# Patient Record
Sex: FEMALE | Race: WHITE
[De-identification: names, ages, dates, MRNs, and addresses within clinical notes are randomized per-mention and may not be internally consistent; named-entity substitution may affect disease eponyms.]

---

## 2018-02-05 NOTE — HP
PRIMARY CARE PHYSICIAN:  Unknown.

 

PRESENTING COMPLAINT:  Altered mental status.

 

HISTORY OF PRESENT ILLNESS:  A 68-year-old patient who was found with a change 
of mental status at home.  Her mother found her on the ground with an empty 
bottle of tramadol by her side.  It seemed she has had ingested at least 10 
pills, but total amount is actually unknown as is the time of ingestion.  EMS 
was immediately called and the patient was given Narcan, which she seems to 
respond to and was alert and oriented x2.  History limited due to the patient 
being lethargic.  On arrival at the emergency room, she was hypertensive with 
blood pressure of 172/76 with a pulse rate of 26 and a respiratory rate of 21.  
She was saturating at 88% on room air.  She was immediately placed on nasal 
cannula oxygen.  Labs revealed mild leukocytosis.  Chemistry was largely 
unrevealing with troponin less than 0.010.  TSH was 1.6.  Urine toxicology 
detected tricyclics.  Emergency room physicians called Poison Control and 
recommendation was conservative managements with IV fluids and to be on the 
lookout for possible serotonin syndrome.

 

PAST MEDICAL HISTORY:  Chronic lower back pain, migraine headaches, 
hypothyroidism and diverticulitis.

 

PAST SURGICAL HISTORY:  Lumpectomy and lipomectomy.

 

ALLERGIES:  PENICILLIN and PROPOXYPHENE.

 

FAMILY AND SOCIAL HISTORY:  Unobtainable due to patient's current condition.  
She is a former smoker with no history of drug use or alcohol use (from chart 
review).

 

REVIEW OF SYSTEMS:  Review of systems is unobtainable due to patient's current 
altered status.

 

PHYSICAL EXAMINATION:

GENERAL:  The patient is lethargic, but oriented to person and place.

VITAL SIGNS:  Blood pressure is 132/84, heart rate is 95, respiratory rate is 20
, she is saturating at 92% on 4 liters of supplemental oxygen via nasal cannula.

HEENT:  Pupils are dilated, responsive to the light.  No pallor, anicteric.

NECK:  Supple.

LUNGS:  Patient is taking shallow breaths, but vesicular with no wheezes or 
rales appreciated.

CARDIOVASCULAR:  S1 and S2 only.  No murmurs, rubs or gallop.

ABDOMEN:  Soft, nontender and nondistended.  Positive bowel sounds.  No 
organomegaly.

EXTREMITIES:  No edema.

NEUROLOGIC:  Unable to cooperate with exam.

SKIN:  Warm, dry and well-perfused.

MUSCULOSKELETAL:  No abnormalities or deformities appreciated.

 

LABORATORY DATA:  CBC and CMP unremarkable.  Urinalysis also unremarkable.  
Toxicology with only significant finding being tricyclic detected.

 

IMAGING DATA:  Chest x-ray:  Enlarged heart with vascular congestion, but no 
consolidation or effusions.  CT brain without contrast showed no acute 
pathology.

 

ASSESSMENT AND PLAN:

1.  Medication overdose, unclear if it is an accidental or intentional 
overdose.  Patient will be admitted to tele and recommendation from Poison 
Control is to monitor the patient for at least 6-8 hours and observe for 
possible serotonin syndrome.  She will be placed on tele given liberal IV 
fluids.  We will obtain an [employer] evaluation when patient is more alert and 
oriented.  We will also have Narcan p.r.n. for any signs of respiratory 
depression.

2.  Anxiety and depression.  The patient's medication will be held.

3.  Chronic pain disorder, also her medications will be held and she will be 
observed closely while in the hospital.  Of note, patient also has a history of 
fibromyalgia and this is actually her second episode of accidental overdose of 
prescription medications.  Last admission was in 11/2017 where she reported 
taking extra medications due to continued pain.  She became altered and 
required admission to the ICU, but no intubation.

 

[place]

## 2018-02-05 NOTE — CT
CT BRAIN WITHOUT CONTRAST:

 

HISTORY:

Altered mental status.

 

COMPARISON:

11/22/2016

 

FINDINGS:

Changes of mild chronic small vessel ischemic disease are again seen in the periventricular white mat
ter.  No evidence of acute infarct, hemorrhage, midline shift, or abnormal extraaxial fluid collectio
ns is seen.  The ventricular size is normal, and the basilar cisterns are patent.  The bony calvarium
 is intact.  The visualized paranasal sinuses and mastoid air cells are well aerated.

 

IMPRESSION:

No CT evidence of acute intracranial process.

 

POS: [facility]

## 2018-02-07 NOTE — DIS
DATE OF ADMISSION:  02/05/2018

 

DATE OF DISCHARGE:  02/06/2018

 

PRIMARY DIAGNOSIS:  Medication overdose.

 

SECONDARY DIAGNOSES:  Anxiety/depression, chronic pain disorder, hypothyroidism, migraine headaches

 

HISTORY OF PRESENT ILLNESS AND HOSPITAL COURSE:  This is a 68-year-old patient, Ms. Tatum fields presented to the emergency room after being found with a change of mental status at home.  The pat
leeann's mother found on the ground with an empty bottle of tramadol by her side.  It seems she had ing
ested maybe 10 pills, but total amount actually unknown as well as the time of ingestion.  EMS was im
mediately called and patient was given Narcan on which she seemed to respond to and became alert and 
oriented x2.  History limited due to patient being lethargic.  On examination of the emergency room, 
she was hypertensive with blood pressure of 172/76 with a pulse rate of 26 and respiratory rate of 21
.  She was saturating at 88% on room air.  She was immediately placed on nasal cannula oxygen.  Labs 
revealed mild leukocytosis.  Serum chemistry was largely unrevealing with troponin less than 0.010.  
TSH was 1.6.  Urine toxicology detected tricyclics, which the patient takes at home.  Emergency room 
physician called Poison Control.  Recommendation was conservative management with IV fluids.  Monitor
 on telemetry for at least 6 hours and to be on the lookout for possible serotonin syndrome.  During 
her stay, she became alert and oriented x3 and was back to her baseline.  Labs were unremarkable.  Di
scussion was had with the patient regarding her medication use and she denied intention of ingestion 
of her pain meds.  Further discussion was held regarding to dialing down on her pain medication due t
o this pain, second time she was admitted for medication overdose.  The patient declined and would li
ke to continue to take her medications as prescribed by her primary care physician.  She was then dis
charged without incident.

 

DISCHARGE MEDICATIONS:  Potassium chloride 20 mEq every morning with breakfast for 5 days, levothyrox
ine sodium 50 mcg daily, cyclobenzaprine 10 mg orally at bedtime as needed, esomeprazole 40 mg oral e
very morning with breakfast, tramadol 50 mg 3 times a day as needed for pain, pregabalin 300 mg twice
 daily, citalopram  10 mg oral daily, acetaminophen 325 mg 4 times a day as needed for pain.

 

PHYSICAL EXAMINATION:

VITAL SIGNS:  Temperature 97.8, pulse 66, blood pressure 130/60, respirations 17, oxygen saturation 9
7% on room air.

 

LABORATORY DATA:  Sodium 138, potassium 3.5, chloride 104, carbon dioxide 28, anion gap 7, BUN 11, cr
eatinine 0.58.  WBC 14.6, RBC 3.92, hemoglobin 11.8, platelets 245.

 

DISPOSITION:  Stable and improved.

 

CONSULTATION:  Poison Control.

 

PROCEDURES:  None.

 

DIET:  Regular.

 

CARE GOALS:  The patient encouraged to follow up with her primary care physician within 1 week of dis
charge.  She is also encouraged to return to the emergency room if she developed any episode of confu
luis, chest pain, shortness of breath or loss of consciousness.

 

ACTIVITY:  To resume as tolerated.  At the time of discharge, 65 minutes including chart review and d
ocumentation.

## 2018-12-29 ENCOUNTER — HOSPITAL ENCOUNTER (INPATIENT)
Dept: HOSPITAL 92 - ERS | Age: 68
LOS: 6 days | Discharge: NURSING, REHAB OR LONG-TERM CARE | DRG: 492 | End: 2019-01-04
Attending: FAMILY MEDICINE | Admitting: FAMILY MEDICINE
Payer: MEDICARE

## 2018-12-29 VITALS — BODY MASS INDEX: 31.1 KG/M2

## 2018-12-29 DIAGNOSIS — F41.8: ICD-10-CM

## 2018-12-29 DIAGNOSIS — M54.9: ICD-10-CM

## 2018-12-29 DIAGNOSIS — T50.905A: ICD-10-CM

## 2018-12-29 DIAGNOSIS — G93.41: ICD-10-CM

## 2018-12-29 DIAGNOSIS — M79.7: ICD-10-CM

## 2018-12-29 DIAGNOSIS — G43.909: ICD-10-CM
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
## 2018-12-29 DIAGNOSIS — G89.29: ICD-10-CM

## 2018-12-29 DIAGNOSIS — S82.142A: Primary | ICD-10-CM

## 2018-12-29 DIAGNOSIS — E03.9: ICD-10-CM

## 2018-12-29 DIAGNOSIS — S82.832A: ICD-10-CM

## 2018-12-29 DIAGNOSIS — Z88.0: ICD-10-CM

## 2018-12-29 DIAGNOSIS — Z88.8: ICD-10-CM

## 2018-12-29 DIAGNOSIS — I48.0: ICD-10-CM
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
## 2018-12-29 LAB
ALBUMIN SERPL BCG-MCNC: 4.1 G/DL (ref 3.4–4.8)
ALP SERPL-CCNC: 88 U/L (ref 40–150)
ALT SERPL W P-5'-P-CCNC: 20 U/L (ref 8–55)
ANALYZER IN CARDIO: (no result)
ANION GAP SERPL CALC-SCNC: 14 MMOL/L (ref 10–20)
APAP SERPL-MCNC: (no result) MCG/ML (ref 10–30)
AST SERPL-CCNC: 30 U/L (ref 5–34)
BASE EXCESS STD BLDA CALC-SCNC: 1.6 MEQ/L
BASOPHILS # BLD AUTO: 0 THOU/UL (ref 0–0.2)
BASOPHILS NFR BLD AUTO: 0.3 % (ref 0–1)
BILIRUB SERPL-MCNC: 0.5 MG/DL (ref 0.2–1.2)
BUN SERPL-MCNC: 16 MG/DL (ref 9.8–20.1)
CA-I BLDA-SCNC: 1.1 MMOL/L (ref 1.12–1.3)
CALCIUM SERPL-MCNC: 8.9 MG/DL (ref 7.8–10.44)
CHLORIDE SERPL-SCNC: 96 MMOL/L (ref 98–107)
CK SERPL-CCNC: 602 U/L (ref 29–168)
CO2 SERPL-SCNC: 27 MMOL/L (ref 23–31)
CREAT CL PREDICTED SERPL C-G-VRATE: 0 ML/MIN (ref 70–130)
DRUG SCREEN CUTOFF: (no result)
EOSINOPHIL # BLD AUTO: 0.1 THOU/UL (ref 0–0.7)
EOSINOPHIL NFR BLD AUTO: 0.5 % (ref 0–10)
GLOBULIN SER CALC-MCNC: 2.4 G/DL (ref 2.4–3.5)
GLUCOSE SERPL-MCNC: 99 MG/DL (ref 80–115)
GLUCOSE UR STRIP-MCNC: 250 MG/DL
HCO3 BLDA-SCNC: 26 MEQ/L (ref 22–28)
HCT VFR BLDA CALC: 37 % (ref 36–47)
HGB BLD-MCNC: 12 G/DL (ref 12–16)
HGB BLDA-MCNC: 12.7 G/DL (ref 12–16)
LYMPHOCYTES # BLD: 1.2 THOU/UL (ref 1.2–3.4)
LYMPHOCYTES NFR BLD AUTO: 9.3 % (ref 21–51)
MCH RBC QN AUTO: 28.7 PG (ref 27–31)
MCV RBC AUTO: 87.2 FL (ref 78–98)
MEDTOX CONTROL LINE VALID?: (no result)
MEDTOX READER #: (no result)
MONOCYTES # BLD AUTO: 0.7 THOU/UL (ref 0.11–0.59)
MONOCYTES NFR BLD AUTO: 5.6 % (ref 0–10)
NEUTROPHILS # BLD AUTO: 10.4 THOU/UL (ref 1.4–6.5)
NEUTROPHILS NFR BLD AUTO: 84.2 % (ref 42–75)
O2 A-A PPRESDIFF RESPIRATORY: 55.05 MM[HG] (ref 0–20)
PCO2 BLDA: 40.3 MMHG (ref 35–45)
PH BLDA: 7.43 [PH] (ref 7.35–7.45)
PLATELET # BLD AUTO: 300 THOU/UL (ref 130–400)
PO2 BLDA: 65.7 MMHG (ref 80–?)
POTASSIUM BLD-SCNC: 4.32 MMOL/L (ref 3.7–5.3)
POTASSIUM SERPL-SCNC: 4.6 MMOL/L (ref 3.5–5.1)
RBC # BLD AUTO: 4.19 MILL/UL (ref 4.2–5.4)
SALICYLATES SERPL-MCNC: (no result) MG/DL (ref 15–30)
SODIUM SERPL-SCNC: 132 MMOL/L (ref 136–145)
SP GR UR STRIP: 1.02 (ref 1–1.04)
SPECIMEN DRAWN FROM PATIENT: (no result)
WBC # BLD AUTO: 12.3 THOU/UL (ref 4.8–10.8)

## 2018-12-29 PROCEDURE — 96366 THER/PROPH/DIAG IV INF ADDON: CPT

## 2018-12-29 PROCEDURE — 51701 INSERT BLADDER CATHETER: CPT

## 2018-12-29 PROCEDURE — 96365 THER/PROPH/DIAG IV INF INIT: CPT

## 2018-12-29 PROCEDURE — 81003 URINALYSIS AUTO W/O SCOPE: CPT

## 2018-12-29 PROCEDURE — 80306 DRUG TEST PRSMV INSTRMNT: CPT

## 2018-12-29 PROCEDURE — 85379 FIBRIN DEGRADATION QUANT: CPT

## 2018-12-29 PROCEDURE — 76001: CPT

## 2018-12-29 PROCEDURE — 82805 BLOOD GASES W/O2 SATURATION: CPT

## 2018-12-29 PROCEDURE — 93306 TTE W/DOPPLER COMPLETE: CPT

## 2018-12-29 PROCEDURE — 80307 DRUG TEST PRSMV CHEM ANLYZR: CPT

## 2018-12-29 PROCEDURE — 71275 CT ANGIOGRAPHY CHEST: CPT

## 2018-12-29 PROCEDURE — C1713 ANCHOR/SCREW BN/BN,TIS/BN: HCPCS

## 2018-12-29 PROCEDURE — 87040 BLOOD CULTURE FOR BACTERIA: CPT

## 2018-12-29 PROCEDURE — 82140 ASSAY OF AMMONIA: CPT

## 2018-12-29 PROCEDURE — 84484 ASSAY OF TROPONIN QUANT: CPT

## 2018-12-29 PROCEDURE — 80048 BASIC METABOLIC PNL TOTAL CA: CPT

## 2018-12-29 PROCEDURE — 85025 COMPLETE CBC W/AUTO DIFF WBC: CPT

## 2018-12-29 PROCEDURE — 36415 COLL VENOUS BLD VENIPUNCTURE: CPT

## 2018-12-29 PROCEDURE — 82550 ASSAY OF CK (CPK): CPT

## 2018-12-29 PROCEDURE — 83690 ASSAY OF LIPASE: CPT

## 2018-12-29 PROCEDURE — 80053 COMPREHEN METABOLIC PANEL: CPT

## 2018-12-29 PROCEDURE — 71045 X-RAY EXAM CHEST 1 VIEW: CPT

## 2018-12-29 PROCEDURE — 93005 ELECTROCARDIOGRAM TRACING: CPT

## 2018-12-29 PROCEDURE — 96361 HYDRATE IV INFUSION ADD-ON: CPT

## 2018-12-29 PROCEDURE — 83880 ASSAY OF NATRIURETIC PEPTIDE: CPT

## 2018-12-29 PROCEDURE — 85007 BL SMEAR W/DIFF WBC COUNT: CPT

## 2018-12-29 PROCEDURE — 70450 CT HEAD/BRAIN W/O DYE: CPT

## 2018-12-29 PROCEDURE — 76000 FLUOROSCOPY <1 HR PHYS/QHP: CPT

## 2018-12-29 PROCEDURE — A4353 INTERMITTENT URINARY CATH: HCPCS

## 2018-12-29 PROCEDURE — 85027 COMPLETE CBC AUTOMATED: CPT

## 2018-12-29 PROCEDURE — 83605 ASSAY OF LACTIC ACID: CPT

## 2018-12-29 PROCEDURE — S0020 INJECTION, BUPIVICAINE HYDRO: HCPCS

## 2018-12-29 NOTE — RAD
FOUR VIEWS LEFT KNEE:

 

HISTORY: 

Pain.  Fall.

 

COMPARISON: 

8/5/2016.

 

FINDINGS: 

There is a large suprapatellar effusion.  There is a fracture involving the proximal fibula as well a
s the tibial plateau medially.  There is intraarticular extension.

 

IMPRESSION: 

Suprapatellar effusion with a fat fluid level.  Proximal tibia and fibula fractures.

 

POS: [facility]

## 2018-12-29 NOTE — CT
NONCONTRAST CT HEAD:

 

DATE: 12/29/2018.

 

HISTORY: 

Altered mental status.

 

COMPARISON: 

2/5/2018.

 

FINDINGS: 

Again noted are scattered low-density areas in the periventricular and subcortical white matter which
 are nonspecific but likely reflective of chronic small-vessel ischemic changes which have not progre
ssed from the prior exam.  There is no evidence of an acute cortical infarction, hemorrhage, mass eff
ect, or midline shift.  Low-density focus is seen within the anterior limb of left internal capsule w
hich is more prominent on the prior exam and may be likely attributable to remote lacunar infection. 
 Ventricular system is normal in size, shape, and position.  Minimal mucosal thickening is seen in a 
few ethmoidal air cells.  Mastoid air cells are clear.  Calvarial structures are intact.

 

IMPRESSION: 

1.  No acute intracranial abnormalities demonstrated.

 

2.  Findings likely attributable to chronic small-vessel ischemic changes similar to prior exam.

 

3.  Probable remote lacunar infarction anterior limb left internal capsule.

 

POS: [facility]

## 2018-12-29 NOTE — CT
CT ANGIOGRAM THORAX WITH IV CONTRAST AND 3D RECONSTRUCTIONS:

 

DATE: 12/29/2018.

 

HISTORY: 

Elevated D-dimer.  Bilateral leg pain. 

 

COMPARISON: 

None available.

 

FINDINGS: 

No filling defects are seen in the pulmonary arteries to suggest a pulmonary embolus.

 

Vascular calcifications are seen in the thoracic aorta, but the thoracic aorta is normal in caliber w
ithout evidence of aortic dissection.

 

There are a few scattered patchy ground-glass densities at each lung base which could be related to v
olume loss as this exam was obtained in shallow depth of inspiration, but areas of pneumonitis could 
not be entirely excluded.  There is dependent atelectasis bilaterally.  No pleural effusion, pulmonar
y nodule, or mass is visualized.

 

There is gaseous distention of the esophagus.

 

There is no evidence of lymphadenopathy.

 

There is a small aneurysm involving the distal left splenic artery measuring 15 mm x 12 mm in axial d
imensions.

 

There is a suggestion of a very small hiatal hernia.

 

There is a moderate amount of retained fecal material in the region of the splenic flexure with a sma
ll amount of retained fecal material within the hepatic flexure.

 

IMPRESSION: 

1.  Small splenic artery aneurysm.

 

2.  Ground-glass densities within the lungs bilaterally, and this may be attributable to atelectasis 
given that this exam was obtained in expiratory phase of imaging.  However, areas of pneumonitis coul
d not be entirely excluded.

 

3.  No CT evidence of a pulmonary embolus.

 

4.  Vascular calcifications.

 

5.  Distention of the gallbladder measuring 10.1 cm in length.  No pericholecystic fluid or stranding
 is seen on this exam.

 

6.  No CT evidence of a pulmonary embolus.

 

POS: [facility]

## 2018-12-29 NOTE — HP
PRIMARY CARE PHYSICIAN:  Dr. Robert Wallace or Dr. Stein, uncertain at this time.



CHIEF COMPLAINT:  Found down, altered mental status.



HISTORY OF PRESENT ILLNESS:  History obtained from emergency physician and from EMS

as the patient is altered and unable to give history.  The patient was brought by

EMS from home, where she lives with her mother.  There was by EMS report a fall one

week ago with complaint of right leg pain and this morning fall again with complaint

of left leg pain and upper right leg pain.  The patient was unable to get up after

the fall.  She was down for an unknown length of time.  She was found to be

saturating 85% on room air by EMS and to be mumbling, unable to communicate clearly.

 The patient was brought to the emergency room, there she had a CT of the head,

which has showed an old lacunar infarct, nothing acute.  She also had an elevated

D-dimer of 20.  A CTA of the chest was negative for clots and she had an x-ray of

the left knee, which showed a tibial plateau fracture and proximal fibular fracture.

 CT confirmed these, Dr. Yu was consulted from the emergency room, he stated

to put her in a brace and wait until she clears before doing surgery.  The patient

was given IV fluids and perked up a little bit in the emergency room; however, she

still mostly incoherently mumbling and not able to give any history.  She is able to

follow some basic commands. 



PAST MEDICAL HISTORY:  All medical history taken from the chart as the patient is

altered at this time. 

1. Chronic low back pain.

2. Migraine headaches.

3. Hypothyroidism.

4. Diverticulosis.



PAST SURGICAL HISTORY:  

1. Lumpectomy.

2. Lipectomy.

3. Tonsillectomy and adenoidectomy.



SOCIAL HISTORY:  The patient is reportedly a former smoker.  No known drug or

alcohol abuse in the chart, though she has had multiple admissions for prescription

medication overdose.  She lives with her elderly mother. 



PAST PSYCHIATRIC HISTORY:  Depression.



ALLERGIES:  PENICILLIN AND PROPOXYPHENE.



CURRENT MEDICATIONS:  Unknown.



REVIEW OF SYSTEMS:  Unable to obtain secondary to the patient's altered mental

status. 



PHYSICAL EXAMINATION:

VITAL SIGNS:  Blood pressure 114/79, pulse 75, respirations 20, O2 saturation 98% on

2 L, temperature 98.1. 

GENERAL:  This is a well-developed obese white female, who is alert, but muttering

and mumbling and unable to communicate clearly. 

HEENT:  Pupils are equal, round, and reactive to light.  She does have some rotary

nystagmus.  Oropharynx, dry.  No exudate or lesions. 

NECK:  No masses.  No evidence of trauma.  No lymphadenopathy. 

HEART:  Regular rate and rhythm.  No murmurs, rubs, or gallops. 

LUNGS:  Clear to auscultation bilaterally.  No wheezes, crackles, or rhonchi. 

ABDOMEN:  Soft, obese, nontender to palpation.  Normoactive bowel sounds.  No

hepatosplenomegaly or other masses. 

EXTREMITIES:  The patient has a brace to the left knee.  She has no other evidence

of trauma.  No tenderness on palpation. 

SKIN:  No rashes or lesions noted. 

NEUROLOGIC:  The patient seems to be moving all extremities equally.  She does have

a bit of a tremor.  Her coordination does not appear to be very good right now.  I

will do a full neurologic exam due to her altered mental status. 

PSYCHIATRIC:  She is alert.  She is unable to tell me even her name right now,

though she was able to for the ER doctor earlier.  She is mostly mumbling and

muttering, saying she needs to get out of bed as far as I can tell, but no other

understandable mutters at this time.  This is fluctuated as far as her level per the

nursing since she has gotten here. 



LABORATORY DATA:  White blood cell count 12.3 with a neutrophilic predominance of

84%.  The remainder of the CBC was normal.  Coagulation profile showed a D-dimer of

20.  Arterial blood gas showed normal pH, normal pCO2, and normal pO2.  Complete

metabolic panel was notable for a sodium of 132, chloride of 96.  The rest was

normal.  Creatine kinase was elevated at 602.  Brain natriuretic peptide was normal.

 Lipase was normal.  Troponin was negative.  Ammonia level was normal.  Lactic acid

was normal.  Urinalysis showed no evidence of infection.  Toxicology report just

showed positive for tricyclics. 



DIAGNOSTIC DATA:  Chest x-ray:  I did review the chest x-ray done in the emergency

room along with the radiologist's report, no evidence of infiltrates or

cardiomegaly.  No congestion.  No congestive changes.  No evidence of trauma.  CT

brain imaging and CTA of the chest are as above in the HPI.  EKG done in the

emergency room shows rate of 82 beats per minute, occasional PVCs, NM interval was

normal. 



ASSESSMENT:  

1. Fall with tibial plateau fracture and fibular fracture.  The patient is currently

in a brace when she clears mentally then Dr. Yu will likely take her back to

surgery. 

2. Acute encephalopathy.  This is given her history is most likely due to another

overdose of her tramadol and other medications.  She has had multiple presentations

for this over the last few years.  We will watch her on telemetry and make certain

that her O2 saturations stay up and that she make sure that she clears overtime.  I

anticipated over the next day or so, she will start speaking more coherently.  At

this point, I do not see any other metabolic or source for this altered mental

status.  She does not have any evidence of acute stroke on her exam.  If she does

remain altered without any improvement, we can always consider an MRI. 

3. Hypothyroidism.  Resume home medications.

4. Chronic back pain.  We will need to hold on narcotic pain medications at this

time due to her altered mental status, though she will likely need some pain

medicine for her fractured leg when she starts to clear. 

5. Deep venous thrombosis prophylaxis.  Put the patient on Lovenox daily.

6. Code status.  Unable to discuss due to the patient's altered mental status and no

family available at this time.  For now, she is a full code. 





Job ID:  [id number]

## 2018-12-29 NOTE — CT
CT LEFT KNEE NONCONTRAST:

 

HISTORY: 

Left knee fracture.

 

FINDINGS: 

Sagittally oriented oblique fracture through the articular surface of the medial tibial plateau is pr
esent with 0.4 cm gap and 0.1 cm depression.  Nondisplaced comminuted coronally oriented components a
lso involve the anterior aspect of the tibial spines.  The major fracture exits the medial base of th
e medial tibial plateau with 0.2 cm displacement.  Minimally distracted comminuted axial fracture of 
the proximal tip of the fibular head is best demonstrated on the sagittal images.  Heterogeneous flui
d and fat distend the suprapatellar bursa.

 

Osteoarthritic changes also involve the knee.

 

Within the bone marrow of the partially visualized femoral shaft and the proximal tibial shaft are la
kes of heterogeneous density that may represent vascular anomalies.

 

IMPRESSION: 

1.  Split depression fracture of the medial tibial plateau with large lipohemarthrosis.  Minimal depr
ession.

 

2.  Fibular head fracture.

 

POS: [facility]

## 2018-12-29 NOTE — RAD
ONE VIEW CHEST:

 

COMPARISON: 

2/5/2018.

 

HISTORY: 

Altered mental status.

 

FINDINGS: 

Atherosclerosis of the aorta.  Normal cardiac silhouette.  The pulmonary vessels and hilum are normal
.  Costophrenic angles are clear.  NO mass.  No consolidation.  No pneumothorax or osseous abnormalit
ies.

 

IMPRESSION: 

Atherosclerosis.  No acute cardiopulmonary process.

 

POS: [facility]

## 2018-12-30 LAB
ANION GAP SERPL CALC-SCNC: 14 MMOL/L (ref 10–20)
BASOPHILS # BLD AUTO: 0.1 THOU/UL (ref 0–0.2)
BASOPHILS NFR BLD AUTO: 0.7 % (ref 0–1)
BUN SERPL-MCNC: 5 MG/DL (ref 9.8–20.1)
CALCIUM SERPL-MCNC: 9.1 MG/DL (ref 7.8–10.44)
CHLORIDE SERPL-SCNC: 104 MMOL/L (ref 98–107)
CO2 SERPL-SCNC: 26 MMOL/L (ref 23–31)
CREAT CL PREDICTED SERPL C-G-VRATE: 109 ML/MIN (ref 70–130)
EOSINOPHIL # BLD AUTO: 0.3 THOU/UL (ref 0–0.7)
EOSINOPHIL NFR BLD AUTO: 3.1 % (ref 0–10)
GLUCOSE SERPL-MCNC: 89 MG/DL (ref 80–115)
HGB BLD-MCNC: 13 G/DL (ref 12–16)
LYMPHOCYTES # BLD: 1.3 THOU/UL (ref 1.2–3.4)
LYMPHOCYTES NFR BLD AUTO: 14.6 % (ref 21–51)
MCH RBC QN AUTO: 29.2 PG (ref 27–31)
MCV RBC AUTO: 87.6 FL (ref 78–98)
MONOCYTES # BLD AUTO: 0.6 THOU/UL (ref 0.11–0.59)
MONOCYTES NFR BLD AUTO: 6.4 % (ref 0–10)
NEUTROPHILS # BLD AUTO: 6.8 THOU/UL (ref 1.4–6.5)
NEUTROPHILS NFR BLD AUTO: 75.2 % (ref 42–75)
PLATELET # BLD AUTO: 284 THOU/UL (ref 130–400)
POTASSIUM SERPL-SCNC: 3.8 MMOL/L (ref 3.5–5.1)
RBC # BLD AUTO: 4.47 MILL/UL (ref 4.2–5.4)
SODIUM SERPL-SCNC: 140 MMOL/L (ref 136–145)
WBC # BLD AUTO: 9 THOU/UL (ref 4.8–10.8)

## 2018-12-30 NOTE — CON
DATE OF CONSULTATION:  



REQUESTING PHYSICIAN:  Dr. Jarquin.



BRIEF HISTORY OF PRESENT ILLNESS:  The patient is a 68-year-old lady, who was

brought to the emergency room by family members after a fall at home.  Upon initial

evaluation by the emergency medical services at home, she was found to have low

oxygen saturations, had a complaint of left leg pain and was found to be confused.

On evaluation in the emergency room, she clearly was incoherent and not alert or

oriented.  As such, the patient was admitted to the medical service.  Workup

included chest x-ray, CT angiogram of the chest that showed no evidence of clot or

pulmonary emboli.  She also had x-rays performed of the left leg that did show an

anteromedial tibial plateau fracture and a small avulsion injury from the proximal

fibula.  The patient was placed in a knee immobilizer and has been worked up by the

medical service, and at this time, is felt to be stable for surgical intervention. 



PAST MEDICAL HISTORY:  Remarkable for chronic low back pain, migraine headaches,

hypothyroidism, and diverticulosis. 



PAST SURGICAL HISTORY:  Includes lumpectomy, tonsillectomy, and adenoids.



SOCIAL HISTORY:  The patient is a former smoker.  She denies alcohol abuse, although

does have some difficulties with prescription medications and has been present in

the emergency room for a prior prescription medication overdose. 



ALLERGIES:  TO PENICILLIN AND PROPOXYPHENE.



MEDICATIONS AT HOME:  Include,

1. Acetaminophen.

2. Cleocin.

3. Nexium.

4. Flexeril.

5. Levothyroxine.



PHYSICAL EXAMINATION:

VITAL SIGNS:  She was found to have a temperature of 98.6, heart rate of 64,

respiratory rate of 16, and blood pressure of 177/83.  She is examined in her

hospital bed at [facility]. 

GENERAL:  She is a mildly overweight female, who is lying comfortably in bed.  She

does appear coherent this morning and is awake, alert, and oriented to person,

place, and time. 

HEENT:  Atraumatic, normocephalic. 

HEART:  Shows a regular rate and rhythm without murmur. 

LUNGS:  Clear to auscultation bilaterally with good breath sounds.  Chest wall is

nontender. 

ABDOMEN:  Round and soft with normal bowel sounds. 

EXTREMITIES:  Remarkable for a left lower extremity and knee immobilizer.  She was

found to have a 1+ hemarthrosis within the knee.  Range of motion not tested due to

the acute nature of her injury.  She is tender to palpation at the anteromedial

tibial plateau as well as at the proximal fibula, although no gross deformity or

varus or valgus deformity at the knee is noted.  Distal neurovascular exam is

intact.  She is able to dorsi and plantar flex her ankle and toes comfortably.

There is no pain with passive stretch. 



LABORATORY DATA:  White count of 9.0, hematocrit of 39.1, and 284,000 platelets.

Her basic metabolic panel is roughly within normal limits with a slightly low BUN on

this followup lab.  X-rays are as stated in the history of present illness. 



ASSESSMENT:  A 68-year-old lady status post ground level fall sustaining a fracture

of anteromedial tibial plateau with mild depression of the anteromedial fragment as

well as a small avulsion type injury of the proximal fibula. 



PLAN:  Today, I discussed with the patient that she does indeed have a mildly

displaced fracture of the tibial plateau.  We have discussed treatment options

including benign neglect versus proceeding with an open reduction and internal

fixation to try and restore the joint surface to a more anatomical alignment.  At

this time, the patient would like to 

proceed with surgery.  Today, risks and benefits of the procedure were discussed

with the patient.  Risks include, but are not limited to bleeding, infection, nerve

injury, DVT, 

PE, knee arthritis, knee stiffness, loss of limb or life.  The patient appears to

understand and does wish to proceed.  Written consent will be obtained prior to

surgery. 







Job ID:  [id number]

## 2018-12-30 NOTE — PDOC.PN
- Subjective


Encounter Start Date: 12/30/18


Encounter Start Time: 11:05


Subjective: f/u for L tibial plateau and prox fibula fx s/p fall. Also AMS 

likely


-: due to medication effect. Overall c/o whole body pain and fibromyalgia.





- Objective


Resuscitation Status - Order Detail:





12/29/18 14:03


Resuscitation Status Routine 


   Resuscitation Status: FULL: Full Resuscitation








MAR Reviewed: Yes


Vital Signs & Weight: 


 Vital Signs (12 hours)











  Temp Pulse Resp BP Pulse Ox


 


 12/30/18 09:19  98.6 F  64  16  177/83 H  96


 


 12/30/18 03:02  98.7 F  76  18  147/84 H  98








 Weight











Weight                         173 lb 12.8 oz














I&O: 


 











 12/29/18 12/30/18 12/31/18





 06:59 06:59 06:59


 


Intake Total  1540 


 


Output Total  2600 


 


Balance  -1060 











Result Diagrams: 


 12/30/18 05:02





 12/30/18 05:02


Radiology Reviewed by me: Yes (L knee - prox tibial plateau and prox fibula fx)


EKG Reviewed by me: Yes (Tele - SR)





Phys Exam





- Physical Examination


Constitutional: NAD


HEENT: PERRLA, sclera anicteric, oral pharynx no lesions


Neck: no nodes, no JVD, supple, full ROM


Respiratory: no wheezing, no rales, no rhonchi, clear to auscultation bilateral


S1, S2


Cardiovascular: RRR, no significant murmur, no rub, gallop


Gastrointestinal: soft, non-tender, no distention, positive bowel sounds


L knee with edema


Musculoskeletal: pulses present, edema present


Neurological: normal sensation, moves all 4 limbs


Psychiatric: A&O x 3


Skin: normal turgor, cap refill <2 seconds





Dx/Plan


(1) Acute metabolic encephalopathy


Code(s): G93.41 - METABOLIC ENCEPHALOPATHY   Status: Acute   Comment: Likely 

polypharmacy etiology, improved overall, resume and monitor home medications, 

may need to titrate psychotropes   





(2) Closed fracture of left proximal tibia


Code(s): S82.102A - UNSP FRACTURE OF UPPER END OF LEFT TIBIA, INIT FOR CLOS FX 

  Status: Acute   Comment: Plan for ORIF in am 12/31/18, pain control   





(3) Fracture of left proximal fibula


Code(s): S82.832A - OTH FRACTURE OF UPPER AND LOWER END OF LEFT FIBULA, INIT   

Status: Acute   





(4) Medication overdose


Code(s): T50.901A - POISONING BY UNSP DRUG/MEDS/BIOL SUBST, ACCIDENTAL, INIT   

Status: Suspected   Comment: Suspect polypharmacy, monitor clinically   





(5) Anxiety and depression


Code(s): F41.9 - ANXIETY DISORDER, UNSPECIFIED; F32.9 - MAJOR DEPRESSIVE 

DISORDER, SINGLE EPISODE, UNSPECIFIED   Status: Chronic   





(6) Fibromyalgia


Status: Chronic   





- Plan





Stable currently


-: Lovenox for DVT ppx


-: Morphine Sulfate IV prn pain control


-: Continue IVF's


-: ORIF L knee/tibia in am 12/31/18





* .

## 2018-12-31 PROCEDURE — 0QSH04Z REPOSITION LEFT TIBIA WITH INTERNAL FIXATION DEVICE, OPEN APPROACH: ICD-10-PCS | Performed by: ORTHOPAEDIC SURGERY

## 2018-12-31 RX ADMIN — CLINDAMYCIN PHOSPHATE SCH MLS: 900 INJECTION, SOLUTION INTRAVENOUS at 21:29

## 2018-12-31 RX ADMIN — Medication SCH: at 21:31

## 2018-12-31 RX ADMIN — CLINDAMYCIN PHOSPHATE SCH MLS: 900 INJECTION, SOLUTION INTRAVENOUS at 13:58

## 2018-12-31 NOTE — RAD
LEFT LOWER LEG 2 VIEWS INTRAOPERATIVE FLUOROSCOPY:

 

HISTORY: 

Fracture.

 

FINDINGS: 

Intraoperative fluoroscopy is provided for internal fixation as performed by Dr. Yu.  Spot fluo
roscopic images show medial compression plate and multiple screws transfixing the medial tibial plate
au.  Alignment is anatomic.  

 

FLUORO TIME:

27 seconds.

 

POS: [facility]

## 2018-12-31 NOTE — OP
DATE OF PROCEDURE:  12/31/2018



PREOPERATIVE DIAGNOSIS:  Left anterior medial tibial plateau fracture.



POSTOPERATIVE DIAGNOSIS:  Left anterior medial tibial plateau fracture.



PROCEDURE PERFORMED:  Open reduction and internal fixation of left medial tibial

plateau. 



ANESTHESIA:  General.



ASSISTANT:  Arabella Jay PA-C.



IMPLANT:  Synthes small fragment T-plate, 4-hole.



TOURNIQUET TIME:  45 minutes at 300 mmHg.



COMPLICATIONS:  None.



DRAINS:  None.



SPECIMENS:  None.



OUTCOME:  Near-anatomic alignment.



INDICATIONS:  The patient is a 68-year-old lady, status post ground level fall,

which was unwitnessed.  The patient was initially brought into the hospital due to

altered mental state and altered level of consciousness.  During workup, she had

complaints of left knee pain and x-ray and followup CT scan confirmed an anterior

medial tibial plateau fracture with mild depression and displacement.  After

discussion with the patient including risks and benefits, we decided to proceed with

open reduction and internal fixation.  Informed consent has been obtained.  I

believe all questions answered. 



DESCRIPTION OF PROCEDURE:  The patient was brought to the operating room, and a

time-out performed followed by induction of general anesthesia.  The patient was

then positioned supine on the OR table, and a sterile prep and drape was performed

of the left lower extremity.  The limb was then exsanguinated with Esmarch bandage,

tourniquet inflated to 300 mmHg.  An anterior medial vertical skin incision was made

just to the medial side of the tibial tubercle.  After skin was sharply incised,

dissection was carried down bluntly to the underlying fascia.  The fascia was

incised in line with the skin incision and reflected posteriorly and anteriorly

revealing the underlying fracture.  Next, a small fragment 4-hole T-plate was

pre-bent to fit the contour of the anterior medial tibial plateau.  This was then

under C-arm guidance, placed against the bone and then fixed with two 3.5 mm screws

in the vertical limb below the fracture through the plate.  Once affixed, a large

bone tenaculum was used to compress the fracture site, this resulted in anatomic

alignment.  This was then followed by insertion of 2 locking screws through the

horizontal limb of the plate, getting excellent stability across the fracture.  AP,

lateral, oblique C-arm images were then obtained and showed anatomical alignment of

the fracture.  At this point, the incision site was irrigated with bulb syringe and

then closed in layers with 0 Vicryl for the fascia, 2-0 Vicryl subcutaneously, and

staples for the skin.  Xeroform gauze, Webril, and Ace wrap dressing were applied to

the leg, and then the leg was placed back in a knee immobilizer.  The tourniquet was

let down at the completion of dressing, and then the patient was transferred to

recovery room in stable condition.  There were no complications.  She tolerated the

procedure well. 







Job ID:  [id number]

## 2018-12-31 NOTE — PDOC.PN
- Subjective


Encounter Start Date: 12/31/18


Encounter Start Time: 16:35


Subjective: f/u for AMS s/p fall with L tibial plateau fx with ORIF 12/31/19.


-: Post op pt developed A-fib RVR per nursing. Started on Cardizem


-: gtt after bolus. 





- Objective


Resuscitation Status - Order Detail:





12/29/18 14:03


Resuscitation Status Routine 


   Resuscitation Status: FULL: Full Resuscitation








MAR Reviewed: Yes


Vital Signs & Weight: 


 Vital Signs (12 hours)











  Temp Pulse Resp BP Pulse Ox


 


 12/31/18 10:35      98


 


 12/31/18 10:30  98.2 F  90  18  149/62 H  98


 


 12/31/18 04:38  98.1 F  67  18  148/69 H  95








 Weight











Weight                         173 lb 12.8 oz














I&O: 


 











 12/30/18 12/31/18 01/01/19





 06:59 06:59 06:59


 


Intake Total 1540 1320 


 


Output Total 2600 500 


 


Balance -1060 820 











Result Diagrams: 


 12/30/18 05:02





 12/30/18 05:02


EKG Reviewed by me: Yes (Tele - A-fib in 140's)





Phys Exam





- Physical Examination


alert, states a few words


HEENT: PERRLA, sclera anicteric, oral pharynx no lesions


Neck: no nodes, no JVD, supple, full ROM


Respiratory: no wheezing, no rales, no rhonchi, clear to auscultation bilateral


S1, S2, tachycardica


Cardiovascular: no significant murmur, no rub, irregular


Gastrointestinal: soft, non-tender, no distention, positive bowel sounds


LLE with surgical dressing and brace in place


Musculoskeletal: pulses present


Neurological: moves all 4 limbs


Skin: normal turgor, cap refill <2 seconds





Dx/Plan


(1) Atrial fibrillation with RVR


Code(s): I48.91 - UNSPECIFIED ATRIAL FIBRILLATION   Status: Acute   Comment: 

Apparently new-onset, Cardizem 10mg IV bolus then gtt @ 5mg/h, 2D Echo pending, 

consider Cardiology evaluation   





(2) Acute metabolic encephalopathy


Code(s): G93.41 - METABOLIC ENCEPHALOPATHY   Status: Acute   Comment: Likely 

polypharmacy etiology, improved overall, resume and monitor home medications, 

may need to titrate psychotropes   





(3) Closed fracture of left proximal tibia


Code(s): S82.102A - UNSP FRACTURE OF UPPER END OF LEFT TIBIA, INIT FOR CLOS FX 

  Status: Acute   Comment: s/p ORIF 12/31/18, pain control   





(4) Fracture of left proximal fibula


Code(s): S82.832A - OTH FRACTURE OF UPPER AND LOWER END OF LEFT FIBULA, INIT   

Status: Acute   Comment: LLE brace per Ortho   





(5) Medication overdose


Code(s): T50.901A - POISONING BY UNSP DRUG/MEDS/BIOL SUBST, ACCIDENTAL, INIT   

Status: Suspected   Comment: Suspect polypharmacy, monitor clinically   





(6) Anxiety and depression


Code(s): F41.9 - ANXIETY DISORDER, UNSPECIFIED; F32.9 - MAJOR DEPRESSIVE 

DISORDER, SINGLE EPISODE, UNSPECIFIED   Status: Chronic   Comment: Resume 

Lyrica and Lexapro   





(7) Fibromyalgia


Status: Chronic   





- Plan


continue antibiotics, 


Continue supportive mgmt


-: Cardizem gtt


-: Check 2D echo 


-: Morphine Sulfate IV for pain control


-: Resume Lyrica and Lexapro





* AM lab: BMP, CBC

## 2019-01-01 LAB
ANION GAP SERPL CALC-SCNC: 17 MMOL/L (ref 10–20)
BUN SERPL-MCNC: 7 MG/DL (ref 9.8–20.1)
CALCIUM SERPL-MCNC: 9 MG/DL (ref 7.8–10.44)
CHLORIDE SERPL-SCNC: 102 MMOL/L (ref 98–107)
CO2 SERPL-SCNC: 22 MMOL/L (ref 23–31)
CREAT CL PREDICTED SERPL C-G-VRATE: 103 ML/MIN (ref 70–130)
GLUCOSE SERPL-MCNC: 104 MG/DL (ref 80–115)
HGB BLD-MCNC: 14.9 G/DL (ref 12–16)
MCH RBC QN AUTO: 30.2 PG (ref 27–31)
MCV RBC AUTO: 86.6 FL (ref 78–98)
MDIFF COMPLETE?: YES
PLATELET # BLD AUTO: 284 THOU/UL (ref 130–400)
PLATELET BLD QL SMEAR: (no result)
POTASSIUM SERPL-SCNC: 3.3 MMOL/L (ref 3.5–5.1)
RBC # BLD AUTO: 4.94 MILL/UL (ref 4.2–5.4)
SODIUM SERPL-SCNC: 138 MMOL/L (ref 136–145)
WBC # BLD AUTO: 11.6 THOU/UL (ref 4.8–10.8)

## 2019-01-01 RX ADMIN — Medication SCH ML: at 20:35

## 2019-01-01 RX ADMIN — Medication SCH: at 10:17

## 2019-01-01 RX ADMIN — Medication SCH ML: at 10:36

## 2019-01-01 RX ADMIN — CLINDAMYCIN PHOSPHATE SCH MLS: 900 INJECTION, SOLUTION INTRAVENOUS at 06:33

## 2019-01-01 NOTE — PDOC.PN
- Subjective


Encounter Start Date: 01/01/19


Encounter Start Time: 13:25


Subjective: f/u for paroxysmal A-fib RVR tx with Cardizem gtt now SR. s/p


-: L tibial plateau fx with ORIF POD #1. c/o of some HA improved with


-: and ice pack.





- Objective


Resuscitation Status - Order Detail:





12/29/18 14:03


Resuscitation Status Routine 


   Resuscitation Status: FULL: Full Resuscitation








MAR Reviewed: Yes


Vital Signs & Weight: 


 Vital Signs (12 hours)











  Temp Pulse Resp BP BP Pulse Ox


 


 01/01/19 07:59       97


 


 01/01/19 07:57  98.2 F  71  15   120/65  97


 


 01/01/19 04:53  99.2 F  84  14  140/75   97








 Weight











Weight                         178 lb 3.2 oz














I&O: 


 











 12/31/18 01/01/19 01/02/19





 06:59 06:59 06:59


 


Intake Total 1320 753 


 


Output Total 500 1950 


 


Balance 820 -1197 











Result Diagrams: 


 01/01/19 05:32





 01/01/19 05:32


Additional Labs: 





Microbiology





12/29/18 08:17   Venous blood - Left Arm   Blood Culture - Preliminary


                              NO GROWTH AT 48 HOURS


12/29/18 07:55   Venous blood - Left Arm   Blood Culture - Preliminary


                              NO GROWTH AT 48 HOURS





 Laboratory Tests











  12/30/18





  05:02


 


Potassium  3.8











EKG Reviewed by me: Yes (Tele - SR)





Phys Exam





- Physical Examination


Constitutional: NAD


HEENT: PERRLA, sclera anicteric, oral pharynx no lesions


Neck: no nodes, no JVD, supple, full ROM


Respiratory: no wheezing, no rales, no rhonchi, clear to auscultation bilateral


S1, S2


Cardiovascular: RRR, no significant murmur, no rub, gallop


Gastrointestinal: soft, non-tender, no distention, positive bowel sounds


LLE/knee edema with dressing in place


Musculoskeletal: pulses present


Neurological: normal sensation, moves all 4 limbs


Psychiatric: A&O x 3


Skin: normal turgor, cap refill <2 seconds





Dx/Plan


(1) Atrial fibrillation with RVR


Code(s): I48.91 - UNSPECIFIED ATRIAL FIBRILLATION   Status: Acute   Comment: 

Apparently new-onset converting back to SR in less than 24h, d/c Cardizem,  2D 

Echo pending   





(2) Acute metabolic encephalopathy


Code(s): G93.41 - METABOLIC ENCEPHALOPATHY   Status: Acute   Comment: Likely 

polypharmacy etiology, improved overall, resume and monitor home medications, 

may need to titrate psychotropes   





(3) Closed fracture of left proximal tibia


Code(s): S82.102A - UNSP FRACTURE OF UPPER END OF LEFT TIBIA, INIT FOR CLOS FX 

  Status: Acute   Comment: s/p ORIF 12/31/18, pain control, POD #1   





(4) Fracture of left proximal fibula


Code(s): S82.832A - OTH FRACTURE OF UPPER AND LOWER END OF LEFT FIBULA, INIT   

Status: Acute   Comment: LLE brace per Ortho   





(5) Medication overdose


Code(s): T50.901A - POISONING BY UNSP DRUG/MEDS/BIOL SUBST, ACCIDENTAL, INIT   

Status: Suspected   Comment: Suspect polypharmacy, monitor clinically   





(6) Anxiety and depression


Code(s): F41.9 - ANXIETY DISORDER, UNSPECIFIED; F32.9 - MAJOR DEPRESSIVE 

DISORDER, SINGLE EPISODE, UNSPECIFIED   Status: Chronic   Comment: Resume 

Lyrica and Lexapro   





(7) Fibromyalgia


Status: Chronic   





- Plan


PT/OT, , DVT proph w/SCDs


Stable currently


-: D/C Cardizem gtt


-: Saline lock IVF's


-: 2D echo pending


-: KCL 40meq BID





* AM lab: BMP

## 2019-01-02 LAB
ANION GAP SERPL CALC-SCNC: 16 MMOL/L (ref 10–20)
BUN SERPL-MCNC: 14 MG/DL (ref 9.8–20.1)
CALCIUM SERPL-MCNC: 9.1 MG/DL (ref 7.8–10.44)
CHLORIDE SERPL-SCNC: 103 MMOL/L (ref 98–107)
CO2 SERPL-SCNC: 23 MMOL/L (ref 23–31)
CREAT CL PREDICTED SERPL C-G-VRATE: 98 ML/MIN (ref 70–130)
GLUCOSE SERPL-MCNC: 96 MG/DL (ref 80–115)
POTASSIUM SERPL-SCNC: 5 MMOL/L (ref 3.5–5.1)
SODIUM SERPL-SCNC: 137 MMOL/L (ref 136–145)

## 2019-01-02 RX ADMIN — Medication SCH ML: at 12:24

## 2019-01-02 RX ADMIN — Medication SCH: at 21:13

## 2019-01-02 NOTE — PDOC.PN
- Subjective


Encounter Start Date: 01/02/19


Encounter Start Time: 17:05


Subjective: f/u for L tibial plateau fx with ORIF POD #2. Feels better overall 

and 


-: migraine HA resolving. Ambulated with PT with some LLE pain.





- Objective


Resuscitation Status - Order Detail:





12/29/18 14:03


Resuscitation Status Routine 


   Resuscitation Status: FULL: Full Resuscitation








MAR Reviewed: Yes


Vital Signs & Weight: 


 Vital Signs (12 hours)











  Temp Pulse Pulse Pulse Resp BP BP


 


 01/02/19 14:30       


 


 01/02/19 14:15  97.9 F  82    18  


 


 01/02/19 11:51  98.3 F  77    18  


 


 01/02/19 10:35    78  82   142/66 H  175/81 H


 


 01/02/19 08:24    79  83   130/85  156/88 H


 


 01/02/19 07:52       


 


 01/02/19 07:32  98.6 F  80    16  














  BP BP Pulse Ox


 


 01/02/19 14:30    97


 


 01/02/19 14:15   133/83  97


 


 01/02/19 11:51  140/98 H  


 


 01/02/19 10:35   


 


 01/02/19 08:24   


 


 01/02/19 07:52    97


 


 01/02/19 07:32  136/91 H   97








 Weight











Weight                         175 lb 11.2 oz














I&O: 


 











 01/01/19 01/02/19 01/03/19





 06:59 06:59 06:59


 


Intake Total 753 867 


 


Output Total 1950 560 


 


Balance -1197 307 











Result Diagrams: 


 01/01/19 05:32





 01/02/19 05:15


Radiology Reviewed by me: Yes (2D echo - EF 60-65%, Grade I/III diast dysfxn)





Phys Exam





- Physical Examination


Constitutional: NAD


HEENT: PERRLA, sclera anicteric, oral pharynx no lesions


Neck: no nodes, no JVD, supple, full ROM


Respiratory: no wheezing, no rales, no rhonchi, clear to auscultation bilateral


S1, S2


Cardiovascular: RRR, no significant murmur, no rub, gallop


Gastrointestinal: soft, non-tender, no distention, positive bowel sounds


LLE with edema of knee, surgical dressing in place


Musculoskeletal: pulses present


Neurological: normal sensation, moves all 4 limbs


Psychiatric: A&O x 3


Skin: normal turgor, cap refill <2 seconds





Dx/Plan


(1) Atrial fibrillation with RVR


Code(s): I48.91 - UNSPECIFIED ATRIAL FIBRILLATION   Status: Acute   Comment: 

Apparently new-onset converting back to SR in less than 24h, d/c Cardizem,  2D 

Echo showed preserved EF with mild diast dysfunction   





(2) Acute metabolic encephalopathy


Code(s): G93.41 - METABOLIC ENCEPHALOPATHY   Status: Acute   Comment: Likely 

polypharmacy etiology, improved overall, resume and monitor home medications, 

may need to titrate psychotropes   





(3) Closed fracture of left proximal tibia


Code(s): S82.102A - UNSP FRACTURE OF UPPER END OF LEFT TIBIA, INIT FOR CLOS FX 

  Status: Acute   Comment: s/p ORIF 12/31/18, pain control, POD #2, pain control

, OOB/ambulate with PT   





(4) Fracture of left proximal fibula


Code(s): S82.832A - OTH FRACTURE OF UPPER AND LOWER END OF LEFT FIBULA, INIT   

Status: Acute   Comment: LLE brace per Ortho   





(5) Medication overdose


Code(s): T50.901A - POISONING BY UNSP DRUG/MEDS/BIOL SUBST, ACCIDENTAL, INIT   

Status: Suspected   Comment: Suspect polypharmacy, monitor clinically   





(6) Anxiety and depression


Code(s): F41.9 - ANXIETY DISORDER, UNSPECIFIED; F32.9 - MAJOR DEPRESSIVE 

DISORDER, SINGLE EPISODE, UNSPECIFIED   Status: Chronic   Comment: Resume 

Lyrica and Lexapro   





(7) Fibromyalgia


Status: Chronic   Comment: Continue Lexapro and Lyrica   





- Plan


PT/OT, , out of bed/ambulate, DVT proph w/lovenox


Stable overall


-: Continue pain control as clinically indicated


-: OOB with PT


-: CM for Rehab options


-: Likely d/c in 48h





* .

## 2019-01-03 RX ADMIN — Medication SCH ML: at 19:56

## 2019-01-03 RX ADMIN — Medication SCH ML: at 08:48

## 2019-01-03 NOTE — PDOC.PN
- Subjective


Encounter Start Date: 01/03/19


Encounter Start Time: 15:00


Subjective: f/u s/p L tibial plateau fx with ORIF POD #3. Non-weight bearing on 

LLE.


-: Doing ok overall and less HA's. Appetite improving.





- Objective


Resuscitation Status - Order Detail:





12/29/18 14:03


Resuscitation Status Routine 


   Resuscitation Status: FULL: Full Resuscitation








MAR Reviewed: Yes


Vital Signs & Weight: 


 Vital Signs (12 hours)











  Temp Pulse Resp BP Pulse Ox


 


 01/03/19 12:00  97.7 F  70  12  122/81  94 L


 


 01/03/19 08:42      96


 


 01/03/19 08:00  97.8 F  76  12  138/84  96


 


 01/03/19 04:48  98 F  69  20  137/84  96








 Weight











Weight                         175 lb 11.2 oz














I&O: 


 











 01/02/19 01/03/19 01/04/19





 06:59 06:59 06:59


 


Intake Total 867 1870 


 


Output Total 560  


 


Balance 307 1870 











Result Diagrams: 


 01/01/19 05:32





 01/02/19 05:15


Additional Labs: 





Microbiology





12/29/18 08:17   Venous blood - Left Arm   Blood Culture - Preliminary


                              NO GROWTH AT 48 HOURS


12/29/18 07:55   Venous blood - Left Arm   Blood Culture - Preliminary


                              NO GROWTH AT 48 HOURS





 Laboratory Tests











  12/30/18





  05:02


 


Potassium  3.8














Phys Exam





- Physical Examination


Constitutional: NAD


HEENT: PERRLA, sclera anicteric, oral pharynx no lesions


Neck: no nodes, no JVD, supple, full ROM


Respiratory: no wheezing, no rales, no rhonchi, clear to auscultation bilateral


S1, S2


Cardiovascular: RRR, no significant murmur, no rub, gallop


Gastrointestinal: soft, non-tender, no distention, positive bowel sounds


LLE with dressing in place


Musculoskeletal: pulses present


Neurological: normal sensation, moves all 4 limbs


Psychiatric: A&O x 3


Skin: normal turgor, cap refill <2 seconds





Dx/Plan


(1) Atrial fibrillation with RVR


Code(s): I48.91 - UNSPECIFIED ATRIAL FIBRILLATION   Status: Acute   Comment: 

Apparently new-onset converting back to SR in less than 24h, d/c Cardizem,  2D 

Echo showed preserved EF with mild diast dysfunction, sinus rhythm currently, 

continue medical mgmt   





(2) Acute metabolic encephalopathy


Code(s): G93.41 - METABOLIC ENCEPHALOPATHY   Status: Acute   Comment: Likely 

polypharmacy etiology, improved overall, resume and monitor home medications, 

may need to titrate psychotropes, resolved   





(3) Closed fracture of left proximal tibia


Code(s): S82.102A - UNSP FRACTURE OF UPPER END OF LEFT TIBIA, INIT FOR CLOS FX 

  Status: Acute   Comment: s/p ORIF 12/31/18, pain control, POD #3, pain control

, OOB/ambulate with PT   





(4) Fracture of left proximal fibula


Code(s): S82.832A - OTH FRACTURE OF UPPER AND LOWER END OF LEFT FIBULA, INIT   

Status: Acute   Comment: LLE brace per Ortho   





(5) Medication overdose


Code(s): T50.901A - POISONING BY UNSP DRUG/MEDS/BIOL SUBST, ACCIDENTAL, INIT   

Status: Suspected   Comment: Suspect polypharmacy, monitor clinically   





(6) Anxiety and depression


Code(s): F41.9 - ANXIETY DISORDER, UNSPECIFIED; F32.9 - MAJOR DEPRESSIVE 

DISORDER, SINGLE EPISODE, UNSPECIFIED   Status: Chronic   Comment: Resume 

Lyrica and Lexapro   





(7) Fibromyalgia


Status: Chronic   Comment: Continue Lexapro and Lyrica   





- Plan


PT/OT, , out of bed/ambulate, DVT proph w/lovenox


Stable currently


-: Continue OOB with PT


-: Inpt Rehab screening


-: D/C KCL


-: Continue Levothyroxine 50mcg daily





* .

## 2019-01-04 VITALS — SYSTOLIC BLOOD PRESSURE: 139 MMHG | DIASTOLIC BLOOD PRESSURE: 85 MMHG | TEMPERATURE: 98.3 F

## 2019-01-04 RX ADMIN — Medication SCH ML: at 09:01

## 2019-01-05 NOTE — DIS
DATE OF ADMISSION:  12/29/2018



DATE OF DISCHARGE:  01/04/2019



DISCHARGE DIAGNOSES:  

1. Acute metabolic encephalopathy, multifactorial including polypharmacy, resolved.

2. Left proximal tibial plateau fracture, status post open reduction and internal

fixation on 12/31/2018. 

3. Proximal left fibula fracture, nonoperative management.

4. Atrial fibrillation with rapid ventricular response with current sinus mechanism.

5. Anxiety/depression.

6. Fibromyalgia.



CONSULTATIONS:  Dr. Yu with [facility].



PERTINENT LABORATORY AND X-RAY FINDINGS:  Potassium ranged between 3.3 to 5.0.

Lactic acid level 1.0.  BNP 76.5.  CBC showed a white blood cell count ranging

between 9.0 to 12.3, hemoglobin ranged between 12.0 to 14.9.  Urine drug screen

positive for tricyclics.  Plasma alcohol level less than 10.  Blood cultures x2

dated 12/29/2018, showed no growth at 5 days.  Portable chest x-ray dated

12/29/2018, showed no acute cardiopulmonary process.  CT of the brain without

contrast dated 12/29/2018, showed no acute intracranial process.  Small vessel

ischemic changes noted.  Remote lacunar infarct of the anterior limb of left

internal capsule.  Four views of the left knee dated 12/29/2018, showed

suprapatellar effusion with fat fluid level.  Proximal tibia and fibula fractures

noted.  CT angiogram of the chest dated 12/29/2018, showed atelectasis of bilateral

lung fields.  No evidence for pulmonary embolus.  2D transthoracic echocardiogram

dated 01/01/2019, showed ejection fraction of 60% to 65%.  Grade 1/3 diastolic

dysfunction noted. 



HOSPITAL COURSE:  The patient was initially admitted after presenting with altered

mental status and apparent fall.  The patient underwent extensive evaluation

including imaging of the left lower extremity showing evidence of a proximal tibia

and fibula fracture.  The patient also underwent evaluation with neuroimaging

showing no acute intracranial process.  The patient was evaluated by the Orthopedic

Surgery Service due to the left lower extremity fracture and underwent open

reduction and internal fixation of the left tibial plateau fracture on 12/31/2018.

The patient is also given general supportive management with likely polypharmacy at

the underlying etiology of the patient's presentation with altered mentation.  The

patient clinically improved and mentally cleared with supportive management and

titration of her psychotropic and pain medications.  The patient's postoperative

course was unremarkable and continued on a left lower extremity knee immobilizer.

The patient remains nonweightbearing on the left lower extremity at the direction of

the [facility] and was deemed an appropriate candidate due to

mobility concerns and previous altered mentation to receive supervised medical care

in an inpatient rehabilitation setting.  I have examined the patient at the time of

discharge and discussed followup instructions.  The patient verbalized understanding

and agreement, ready for discharge on 01/04/2019. 



DISCHARGE MEDICATIONS:  

1. Flexeril 10 mg p.o. at bedtime p.r.n.

2. Lexapro 20 mg p.o. daily.

3. Levothyroxine 50 mcg p.o. daily.

4. Lovenox 40 mg subcutaneously daily.

5. Lyrica 300 mg p.o. b.i.d.

6. Sumatriptan 100 mg p.o. daily p.r.n. migraine headaches.

7. Tramadol 50 mg 1 to 2 tablets p.o. q.6 hours p.r.n. pain.



FOLLOWUP:  The patient may follow up with Dr. Robert Wallace after discharge from

inpatient rehabilitation.  The patient may follow up with Dr. Yu with

[facility] and to call his office for appointment, time, and date. 



CONDITION ON DISCHARGE:  Stable.



ACTIVITY:  Ad-bebe.  Nonweightbearing on the left lower extremity.



DIET:  Regular.



CODE STATUS:  Full.



DISPOSITION:  [facility] on 01/04/2019.



TIME SPENT:  Total time preparing and coordinating discharge, 33 minutes.







Job ID:  [id number]

## 2019-02-11 ENCOUNTER — HOSPITAL ENCOUNTER (EMERGENCY)
Dept: HOSPITAL 92 - ERS | Age: 69
Discharge: HOME | End: 2019-02-11
Payer: MEDICARE

## 2019-02-11 DIAGNOSIS — F32.9: ICD-10-CM

## 2019-02-11 DIAGNOSIS — M25.562: ICD-10-CM

## 2019-02-11 DIAGNOSIS — Z87.891: ICD-10-CM

## 2019-02-11 DIAGNOSIS — M81.0: ICD-10-CM

## 2019-02-11 DIAGNOSIS — E03.9: ICD-10-CM

## 2019-02-11 DIAGNOSIS — M79.7: Primary | ICD-10-CM

## 2019-02-11 PROCEDURE — 96372 THER/PROPH/DIAG INJ SC/IM: CPT

## 2019-03-29 ENCOUNTER — HOSPITAL ENCOUNTER (EMERGENCY)
Dept: HOSPITAL 92 - ERS | Age: 69
LOS: 1 days | Discharge: HOME | End: 2019-03-30
Payer: MEDICARE

## 2019-03-29 DIAGNOSIS — J96.01: ICD-10-CM

## 2019-03-29 DIAGNOSIS — T40.601A: Primary | ICD-10-CM

## 2019-03-29 DIAGNOSIS — G43.909: ICD-10-CM

## 2019-03-29 DIAGNOSIS — F32.9: ICD-10-CM

## 2019-03-29 DIAGNOSIS — E03.9: ICD-10-CM

## 2019-03-29 DIAGNOSIS — Z87.891: ICD-10-CM

## 2019-03-29 DIAGNOSIS — Z79.899: ICD-10-CM

## 2019-03-29 LAB
ALBUMIN SERPL BCG-MCNC: 4 G/DL (ref 3.4–4.8)
ALP SERPL-CCNC: 126 U/L (ref 40–150)
ALT SERPL W P-5'-P-CCNC: 67 U/L (ref 8–55)
ANION GAP SERPL CALC-SCNC: 15 MMOL/L (ref 10–20)
APAP SERPL-MCNC: (no result) MCG/ML (ref 10–30)
AST SERPL-CCNC: 42 U/L (ref 5–34)
BASOPHILS # BLD AUTO: 0.1 THOU/UL (ref 0–0.2)
BASOPHILS NFR BLD AUTO: 0.7 % (ref 0–1)
BILIRUB SERPL-MCNC: 0.3 MG/DL (ref 0.2–1.2)
BUN SERPL-MCNC: 14 MG/DL (ref 9.8–20.1)
CALCIUM SERPL-MCNC: 9.8 MG/DL (ref 7.8–10.44)
CHLORIDE SERPL-SCNC: 100 MMOL/L (ref 98–107)
CO2 SERPL-SCNC: 27 MMOL/L (ref 23–31)
CREAT CL PREDICTED SERPL C-G-VRATE: 0 ML/MIN (ref 70–130)
EOSINOPHIL # BLD AUTO: 0.1 THOU/UL (ref 0–0.7)
EOSINOPHIL NFR BLD AUTO: 0.6 % (ref 0–10)
GLOBULIN SER CALC-MCNC: 3.2 G/DL (ref 2.4–3.5)
GLUCOSE SERPL-MCNC: 112 MG/DL (ref 80–115)
HGB BLD-MCNC: 13.1 G/DL (ref 12–16)
LYMPHOCYTES # BLD: 1.6 THOU/UL (ref 1.2–3.4)
LYMPHOCYTES NFR BLD AUTO: 12.5 % (ref 21–51)
MCH RBC QN AUTO: 29.3 PG (ref 27–31)
MCV RBC AUTO: 91.3 FL (ref 78–98)
MONOCYTES # BLD AUTO: 1.1 THOU/UL (ref 0.11–0.59)
MONOCYTES NFR BLD AUTO: 8.3 % (ref 0–10)
NEUTROPHILS # BLD AUTO: 10.2 THOU/UL (ref 1.4–6.5)
NEUTROPHILS NFR BLD AUTO: 77.9 % (ref 42–75)
PLATELET # BLD AUTO: 258 THOU/UL (ref 130–400)
POTASSIUM SERPL-SCNC: 4.8 MMOL/L (ref 3.5–5.1)
RBC # BLD AUTO: 4.47 MILL/UL (ref 4.2–5.4)
SALICYLATES SERPL-MCNC: (no result) MG/DL (ref 15–30)
SODIUM SERPL-SCNC: 137 MMOL/L (ref 136–145)
WBC # BLD AUTO: 13.1 THOU/UL (ref 4.8–10.8)

## 2019-03-29 PROCEDURE — 85025 COMPLETE CBC W/AUTO DIFF WBC: CPT

## 2019-03-29 PROCEDURE — 80307 DRUG TEST PRSMV CHEM ANLYZR: CPT

## 2019-03-29 PROCEDURE — 93005 ELECTROCARDIOGRAM TRACING: CPT

## 2019-03-29 PROCEDURE — 80053 COMPREHEN METABOLIC PANEL: CPT

## 2019-03-29 PROCEDURE — 96374 THER/PROPH/DIAG INJ IV PUSH: CPT

## 2019-03-29 PROCEDURE — 36416 COLLJ CAPILLARY BLOOD SPEC: CPT

## 2019-03-29 PROCEDURE — 96361 HYDRATE IV INFUSION ADD-ON: CPT

## 2020-04-23 ENCOUNTER — HOSPITAL ENCOUNTER (EMERGENCY)
Dept: HOSPITAL 92 - ERS | Age: 70
Discharge: NURSING, REHAB OR LONG-TERM CARE | End: 2020-04-23
Payer: MEDICARE
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
## 2020-04-23 DIAGNOSIS — F32.9: ICD-10-CM

## 2020-04-23 DIAGNOSIS — Z79.01: ICD-10-CM

## 2020-04-23 DIAGNOSIS — M81.0: ICD-10-CM

## 2020-04-23 DIAGNOSIS — S82.001A: Primary | ICD-10-CM

## 2020-04-23 DIAGNOSIS — Z79.899: ICD-10-CM

## 2020-04-23 DIAGNOSIS — E03.9: ICD-10-CM

## 2020-04-23 DIAGNOSIS — G43.909: ICD-10-CM

## 2020-04-23 NOTE — RAD
RIGHT SHOULDER 3 VIEWS:

 

HISTORY: 

Fall with shoulder pain.

 

FINDINGS: 

There are very mild arthritic changes of the shoulder.  The bones appear demineralized.  I do not see
 any signs of fracture or dislocation.  

 

IMPRESSION: 

No evidence of fracture.

 

POS: [facility]

## 2020-04-23 NOTE — RAD
RIGHT KNEE 4 VIEWS:

 

HISTORY: 

Knee pain.

 

FINDINGS: 

There is a nondisplaced fracture involving the anterior cortex of the patella.  I do not see that it 
definitely extends through the entire patella.  There is a small joint effusion present.  There is so
me minimal medial compartment joint space narrowing.

 

IMPRESSION: 

Nondisplaced patellar fracture.

 

POS: [facility]

## 2020-04-23 NOTE — RAD
LEFT KNEE 4 VIEWS:

 

HISTORY: 

Knee pain.

 

COMPARISON: 

12/31/2018 exam.

 

FINDINGS: 

There has been open reduction internal fixation of a medial tibial fracture with plate and screws.  O
ld fibular head fracture is present.  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
ee.  There are no signs of fracture or dislocation.  The lateral view for this knee is at this point 
is within the right knee folder.

 

IMPRESSION: 

No acute injury.

 

POS: [facility]

## 2021-09-08 NOTE — EKG
Test Reason : AMS

Blood Pressure : ***/*** mmHG

Vent. Rate : 095 BPM     Atrial Rate : 095 BPM

   P-R Int : 156 ms          QRS Dur : 106 ms

    QT Int : 368 ms       P-R-T Axes : [phone] degrees

   QTc Int : 462 ms

 

Normal sinus rhythm

Normal ECG

 

Confirmed by DARIN ROE M.D. ([id number]),  ISAEL BROWN ([id number]) on 3/17/2018 2:20:57 PM

 

Referred By:             Confirmed By:DARIN ROE M.D. patient

## 2021-10-15 ENCOUNTER — HOSPITAL ENCOUNTER (INPATIENT)
Dept: HOSPITAL 92 - CSHERS | Age: 71
LOS: 3 days | Discharge: HOME | DRG: 871 | End: 2021-10-18
Attending: STUDENT IN AN ORGANIZED HEALTH CARE EDUCATION/TRAINING PROGRAM | Admitting: FAMILY MEDICINE
Payer: MEDICARE

## 2021-10-15 VITALS — BODY MASS INDEX: 31.8 KG/M2

## 2021-10-15 DIAGNOSIS — G47.00: ICD-10-CM

## 2021-10-15 DIAGNOSIS — Z87.891: ICD-10-CM

## 2021-10-15 DIAGNOSIS — K08.9: ICD-10-CM

## 2021-10-15 DIAGNOSIS — I48.0: ICD-10-CM

## 2021-10-15 DIAGNOSIS — J18.9: ICD-10-CM

## 2021-10-15 DIAGNOSIS — M79.7: ICD-10-CM

## 2021-10-15 DIAGNOSIS — Z20.822: ICD-10-CM

## 2021-10-15 DIAGNOSIS — G93.41: ICD-10-CM

## 2021-10-15 DIAGNOSIS — A41.9: Primary | ICD-10-CM

## 2021-10-15 DIAGNOSIS — N10: ICD-10-CM

## 2021-10-15 DIAGNOSIS — Z90.89: ICD-10-CM

## 2021-10-15 DIAGNOSIS — R65.20: ICD-10-CM

## 2021-10-15 DIAGNOSIS — E03.9: ICD-10-CM
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
## 2021-10-15 DIAGNOSIS — I50.42: ICD-10-CM

## 2021-10-15 LAB
ALBUMIN SERPL BCG-MCNC: 4.2 G/DL (ref 3.4–4.8)
ALP SERPL-CCNC: 70 U/L (ref 40–110)
ALT SERPL W P-5'-P-CCNC: 9 U/L (ref 8–55)
ANION GAP SERPL CALC-SCNC: 15 MMOL/L (ref 10–20)
APAP SERPL-MCNC: (no result) MCG/ML (ref 10–30)
AST SERPL-CCNC: 14 U/L (ref 5–34)
BASOPHILS # BLD AUTO: 0 10X3/UL (ref 0–0.2)
BASOPHILS NFR BLD AUTO: 0.3 % (ref 0–2)
BILIRUB SERPL-MCNC: 0.4 MG/DL (ref 0.2–1.2)
BUN SERPL-MCNC: 22 MG/DL (ref 9.8–20.1)
CALCIUM SERPL-MCNC: 9.4 MG/DL (ref 7.8–10.44)
CHLORIDE SERPL-SCNC: 106 MMOL/L (ref 98–107)
CK SERPL-CCNC: 158 U/L (ref 29–168)
CO2 SERPL-SCNC: 24 MMOL/L (ref 23–31)
CREAT CL PREDICTED SERPL C-G-VRATE: 0 ML/MIN (ref 70–130)
EOSINOPHIL # BLD AUTO: 0.1 10X3/UL (ref 0–0.5)
EOSINOPHIL NFR BLD AUTO: 0.5 % (ref 0–6)
GLOBULIN SER CALC-MCNC: 2.4 G/DL (ref 2.4–3.5)
GLUCOSE SERPL-MCNC: 108 MG/DL (ref 80–115)
HGB BLD-MCNC: 12.9 G/DL (ref 12–15.5)
LIPASE SERPL-CCNC: 11 U/L (ref 8–78)
LYMPHOCYTES NFR BLD AUTO: 5.8 % (ref 18–47)
MCH RBC QN AUTO: 28.8 PG (ref 27–33)
MCV RBC AUTO: 89.1 FL (ref 81.6–98.3)
MONOCYTES # BLD AUTO: 0.8 10X3/UL (ref 0–1.1)
MONOCYTES NFR BLD AUTO: 6.8 % (ref 0–10)
NEUTROPHILS # BLD AUTO: 10.7 10X3/UL (ref 1.5–8.4)
NEUTROPHILS NFR BLD AUTO: 86.1 % (ref 40–75)
PLATELET # BLD AUTO: 180 10X3/UL (ref 150–450)
POTASSIUM SERPL-SCNC: 4.2 MMOL/L (ref 3.5–5.1)
RBC # BLD AUTO: 4.48 10X6/UL (ref 3.9–5.03)
SALICYLATES SERPL-MCNC: (no result) MG/DL (ref 15–30)
SODIUM SERPL-SCNC: 141 MMOL/L (ref 136–145)
WBC # BLD AUTO: 12.4 10X3/UL (ref 3.5–10.5)

## 2021-10-15 PROCEDURE — 82550 ASSAY OF CK (CPK): CPT

## 2021-10-15 PROCEDURE — 87077 CULTURE AEROBIC IDENTIFY: CPT

## 2021-10-15 PROCEDURE — 71045 X-RAY EXAM CHEST 1 VIEW: CPT

## 2021-10-15 PROCEDURE — 93010 ELECTROCARDIOGRAM REPORT: CPT

## 2021-10-15 PROCEDURE — 93005 ELECTROCARDIOGRAM TRACING: CPT

## 2021-10-15 PROCEDURE — 80048 BASIC METABOLIC PNL TOTAL CA: CPT

## 2021-10-15 PROCEDURE — 87186 SC STD MICRODIL/AGAR DIL: CPT

## 2021-10-15 PROCEDURE — 70450 CT HEAD/BRAIN W/O DYE: CPT

## 2021-10-15 PROCEDURE — 87081 CULTURE SCREEN ONLY: CPT

## 2021-10-15 PROCEDURE — 83735 ASSAY OF MAGNESIUM: CPT

## 2021-10-15 PROCEDURE — 85379 FIBRIN DEGRADATION QUANT: CPT

## 2021-10-15 PROCEDURE — 84443 ASSAY THYROID STIM HORMONE: CPT

## 2021-10-15 PROCEDURE — 36415 COLL VENOUS BLD VENIPUNCTURE: CPT

## 2021-10-15 PROCEDURE — 36416 COLLJ CAPILLARY BLOOD SPEC: CPT

## 2021-10-15 PROCEDURE — 96374 THER/PROPH/DIAG INJ IV PUSH: CPT

## 2021-10-15 PROCEDURE — 87086 URINE CULTURE/COLONY COUNT: CPT

## 2021-10-15 PROCEDURE — 83605 ASSAY OF LACTIC ACID: CPT

## 2021-10-15 PROCEDURE — 85025 COMPLETE CBC W/AUTO DIFF WBC: CPT

## 2021-10-15 PROCEDURE — 83690 ASSAY OF LIPASE: CPT

## 2021-10-15 PROCEDURE — 96375 TX/PRO/DX INJ NEW DRUG ADDON: CPT

## 2021-10-15 PROCEDURE — 82140 ASSAY OF AMMONIA: CPT

## 2021-10-15 PROCEDURE — 80306 DRUG TEST PRSMV INSTRMNT: CPT

## 2021-10-15 PROCEDURE — 83880 ASSAY OF NATRIURETIC PEPTIDE: CPT

## 2021-10-15 PROCEDURE — U0002 COVID-19 LAB TEST NON-CDC: HCPCS

## 2021-10-15 PROCEDURE — 87040 BLOOD CULTURE FOR BACTERIA: CPT

## 2021-10-15 PROCEDURE — 84100 ASSAY OF PHOSPHORUS: CPT

## 2021-10-15 PROCEDURE — 84484 ASSAY OF TROPONIN QUANT: CPT

## 2021-10-15 PROCEDURE — 81003 URINALYSIS AUTO W/O SCOPE: CPT

## 2021-10-15 PROCEDURE — 80307 DRUG TEST PRSMV CHEM ANLYZR: CPT

## 2021-10-15 PROCEDURE — 80053 COMPREHEN METABOLIC PANEL: CPT

## 2021-10-15 PROCEDURE — 81015 MICROSCOPIC EXAM OF URINE: CPT

## 2021-10-15 PROCEDURE — 71275 CT ANGIOGRAPHY CHEST: CPT

## 2021-10-16 LAB
ANION GAP SERPL CALC-SCNC: 14 MMOL/L (ref 10–20)
BACTERIA UR QL AUTO: (no result) HPF
BASOPHILS # BLD AUTO: 0 10X3/UL (ref 0–0.2)
BASOPHILS NFR BLD AUTO: 0.3 % (ref 0–2)
BUN SERPL-MCNC: 15 MG/DL (ref 9.8–20.1)
CALCIUM SERPL-MCNC: 8 MG/DL (ref 7.8–10.44)
CHLORIDE SERPL-SCNC: 111 MMOL/L (ref 98–107)
CO2 SERPL-SCNC: 21 MMOL/L (ref 23–31)
CREAT CL PREDICTED SERPL C-G-VRATE: 88 ML/MIN (ref 70–130)
DRUG SCREEN CUTOFF: (no result)
EOSINOPHIL # BLD AUTO: 0 10X3/UL (ref 0–0.5)
EOSINOPHIL NFR BLD AUTO: 0.3 % (ref 0–6)
GLUCOSE SERPL-MCNC: 99 MG/DL (ref 80–115)
HGB BLD-MCNC: 10.7 G/DL (ref 12–15.5)
LEUKOCYTE ESTERASE UR QL STRIP.AUTO: 100
LYMPHOCYTES NFR BLD AUTO: 11.1 % (ref 18–47)
MAGNESIUM SERPL-MCNC: 1.9 MG/DL (ref 1.6–2.6)
MCH RBC QN AUTO: 28.8 PG (ref 27–33)
MCV RBC AUTO: 90 FL (ref 81.6–98.3)
MONOCYTES # BLD AUTO: 0.8 10X3/UL (ref 0–1.1)
MONOCYTES NFR BLD AUTO: 6.1 % (ref 0–10)
MUCOUS THREADS UR QL AUTO: (no result) LPF
NEUTROPHILS # BLD AUTO: 10.9 10X3/UL (ref 1.5–8.4)
NEUTROPHILS NFR BLD AUTO: 81.6 % (ref 40–75)
PLATELET # BLD AUTO: 169 10X3/UL (ref 150–450)
POTASSIUM SERPL-SCNC: 3.7 MMOL/L (ref 3.5–5.1)
PROT UR STRIP.AUTO-MCNC: 15 MG/DL
RBC # BLD AUTO: 3.71 10X6/UL (ref 3.9–5.03)
RBC UR QL AUTO: (no result) HPF (ref 0–3)
SODIUM SERPL-SCNC: 142 MMOL/L (ref 136–145)
SP GR UR STRIP: 1.02 (ref 1–1.04)
WBC # BLD AUTO: 13.4 10X3/UL (ref 3.5–10.5)

## 2021-10-16 RX ADMIN — AZITHROMYCIN SCH MLS: 500 INJECTION, POWDER, LYOPHILIZED, FOR SOLUTION INTRAVENOUS at 04:35

## 2021-10-17 LAB
ALBUMIN SERPL BCG-MCNC: 3.4 G/DL (ref 3.4–4.8)
ALP SERPL-CCNC: 57 U/L (ref 40–110)
ALT SERPL W P-5'-P-CCNC: 8 U/L (ref 8–55)
ANION GAP SERPL CALC-SCNC: 14 MMOL/L (ref 10–20)
AST SERPL-CCNC: 14 U/L (ref 5–34)
BASOPHILS # BLD AUTO: 0.1 10X3/UL (ref 0–0.2)
BASOPHILS NFR BLD AUTO: 0.7 % (ref 0–2)
BILIRUB SERPL-MCNC: 0.2 MG/DL (ref 0.2–1.2)
BUN SERPL-MCNC: 10 MG/DL (ref 9.8–20.1)
CALCIUM SERPL-MCNC: 8.8 MG/DL (ref 7.8–10.44)
CHLORIDE SERPL-SCNC: 112 MMOL/L (ref 98–107)
CO2 SERPL-SCNC: 24 MMOL/L (ref 23–31)
CREAT CL PREDICTED SERPL C-G-VRATE: 101 ML/MIN (ref 70–130)
EOSINOPHIL # BLD AUTO: 0.2 10X3/UL (ref 0–0.5)
EOSINOPHIL NFR BLD AUTO: 2.7 % (ref 0–6)
GLOBULIN SER CALC-MCNC: 1.9 G/DL (ref 2.4–3.5)
GLUCOSE SERPL-MCNC: 90 MG/DL (ref 80–115)
HGB BLD-MCNC: 10.8 G/DL (ref 12–15.5)
LYMPHOCYTES NFR BLD AUTO: 18.8 % (ref 18–47)
MAGNESIUM SERPL-MCNC: 1.9 MG/DL (ref 1.6–2.6)
MCH RBC QN AUTO: 28.6 PG (ref 27–33)
MCV RBC AUTO: 89.4 FL (ref 81.6–98.3)
MONOCYTES # BLD AUTO: 0.6 10X3/UL (ref 0–1.1)
MONOCYTES NFR BLD AUTO: 6.7 % (ref 0–10)
NEUTROPHILS # BLD AUTO: 6.2 10X3/UL (ref 1.5–8.4)
NEUTROPHILS NFR BLD AUTO: 70.6 % (ref 40–75)
PLATELET # BLD AUTO: 174 10X3/UL (ref 150–450)
POTASSIUM SERPL-SCNC: 4.2 MMOL/L (ref 3.5–5.1)
RBC # BLD AUTO: 3.77 10X6/UL (ref 3.9–5.03)
SODIUM SERPL-SCNC: 146 MMOL/L (ref 136–145)
WBC # BLD AUTO: 8.8 10X3/UL (ref 3.5–10.5)

## 2021-10-17 RX ADMIN — AZITHROMYCIN SCH MLS: 500 INJECTION, POWDER, LYOPHILIZED, FOR SOLUTION INTRAVENOUS at 03:28

## 2021-10-17 RX ADMIN — CEFTRIAXONE SCH MLS: 2 INJECTION, POWDER, FOR SOLUTION INTRAMUSCULAR; INTRAVENOUS at 01:18

## 2021-10-18 VITALS — DIASTOLIC BLOOD PRESSURE: 59 MMHG | SYSTOLIC BLOOD PRESSURE: 126 MMHG | TEMPERATURE: 97.3 F

## 2021-10-18 LAB
ALBUMIN SERPL BCG-MCNC: 3.4 G/DL (ref 3.4–4.8)
ALP SERPL-CCNC: 60 U/L (ref 40–110)
ALT SERPL W P-5'-P-CCNC: 8 U/L (ref 8–55)
ANION GAP SERPL CALC-SCNC: 15 MMOL/L (ref 10–20)
AST SERPL-CCNC: 13 U/L (ref 5–34)
BASOPHILS # BLD AUTO: 0.1 10X3/UL (ref 0–0.2)
BASOPHILS NFR BLD AUTO: 1.1 % (ref 0–2)
BILIRUB SERPL-MCNC: 0.2 MG/DL (ref 0.2–1.2)
BUN SERPL-MCNC: 8 MG/DL (ref 9.8–20.1)
CALCIUM SERPL-MCNC: 9.1 MG/DL (ref 7.8–10.44)
CHLORIDE SERPL-SCNC: 108 MMOL/L (ref 98–107)
CO2 SERPL-SCNC: 26 MMOL/L (ref 23–31)
CREAT CL PREDICTED SERPL C-G-VRATE: 105 ML/MIN (ref 70–130)
EOSINOPHIL # BLD AUTO: 0.2 10X3/UL (ref 0–0.5)
EOSINOPHIL NFR BLD AUTO: 2.9 % (ref 0–6)
GLOBULIN SER CALC-MCNC: 1.9 G/DL (ref 2.4–3.5)
GLUCOSE SERPL-MCNC: 89 MG/DL (ref 80–115)
HGB BLD-MCNC: 11 G/DL (ref 12–15.5)
LYMPHOCYTES NFR BLD AUTO: 26.4 % (ref 18–47)
MAGNESIUM SERPL-MCNC: 2.1 MG/DL (ref 1.6–2.6)
MCH RBC QN AUTO: 28.7 PG (ref 27–33)
MCV RBC AUTO: 88.3 FL (ref 81.6–98.3)
MONOCYTES # BLD AUTO: 0.5 10X3/UL (ref 0–1.1)
MONOCYTES NFR BLD AUTO: 7.7 % (ref 0–10)
NEUTROPHILS # BLD AUTO: 4 10X3/UL (ref 1.5–8.4)
NEUTROPHILS NFR BLD AUTO: 61.3 % (ref 40–75)
PLATELET # BLD AUTO: 206 10X3/UL (ref 150–450)
POTASSIUM SERPL-SCNC: 4.5 MMOL/L (ref 3.5–5.1)
RBC # BLD AUTO: 3.83 10X6/UL (ref 3.9–5.03)
SODIUM SERPL-SCNC: 144 MMOL/L (ref 136–145)
WBC # BLD AUTO: 6.5 10X3/UL (ref 3.5–10.5)

## 2021-10-18 RX ADMIN — AZITHROMYCIN SCH MLS: 500 INJECTION, POWDER, LYOPHILIZED, FOR SOLUTION INTRAVENOUS at 02:08

## 2021-10-18 RX ADMIN — CEFTRIAXONE SCH MLS: 2 INJECTION, POWDER, FOR SOLUTION INTRAMUSCULAR; INTRAVENOUS at 00:48

## 2021-12-21 ENCOUNTER — HOSPITAL ENCOUNTER (INPATIENT)
Dept: HOSPITAL 92 - CSHERS | Age: 71
LOS: 2 days | Discharge: HOME | DRG: 281 | End: 2021-12-23
Attending: FAMILY MEDICINE | Admitting: FAMILY MEDICINE
Payer: MEDICARE

## 2021-12-21 VITALS — BODY MASS INDEX: 29.5 KG/M2

## 2021-12-21 DIAGNOSIS — F32.9: ICD-10-CM

## 2021-12-21 DIAGNOSIS — N18.32: ICD-10-CM

## 2021-12-21 DIAGNOSIS — I48.0: ICD-10-CM

## 2021-12-21 DIAGNOSIS — I21.4: Primary | ICD-10-CM

## 2021-12-21 DIAGNOSIS — K21.9: ICD-10-CM

## 2021-12-21 DIAGNOSIS — F41.9: ICD-10-CM

## 2021-12-21 DIAGNOSIS — Z88.8: ICD-10-CM

## 2021-12-21 DIAGNOSIS — Z79.899: ICD-10-CM

## 2021-12-21 DIAGNOSIS — Z20.822: ICD-10-CM

## 2021-12-21 DIAGNOSIS — Z87.891: ICD-10-CM

## 2021-12-21 DIAGNOSIS — F51.04: ICD-10-CM

## 2021-12-21 DIAGNOSIS — Z90.89: ICD-10-CM

## 2021-12-21 DIAGNOSIS — Z98.890: ICD-10-CM

## 2021-12-21 DIAGNOSIS — Z88.0: ICD-10-CM

## 2021-12-21 DIAGNOSIS — G89.4: ICD-10-CM

## 2021-12-21 DIAGNOSIS — I50.42: ICD-10-CM

## 2021-12-21 DIAGNOSIS — Z90.49: ICD-10-CM

## 2021-12-21 DIAGNOSIS — E03.9: ICD-10-CM

## 2021-12-21 DIAGNOSIS — M79.7: ICD-10-CM

## 2021-12-21 LAB
ALBUMIN SERPL BCG-MCNC: 5 G/DL (ref 3.4–4.8)
ALP SERPL-CCNC: 94 U/L (ref 40–110)
ALT SERPL W P-5'-P-CCNC: 12 U/L (ref 8–55)
ANION GAP SERPL CALC-SCNC: 27 MMOL/L (ref 10–20)
APTT PPP: 31 SEC (ref 22–33)
AST SERPL-CCNC: 27 U/L (ref 5–34)
BASOPHILS # BLD AUTO: 0 10X3/UL (ref 0–0.2)
BASOPHILS NFR BLD AUTO: 0.2 % (ref 0–2)
BILIRUB SERPL-MCNC: 0.7 MG/DL (ref 0.2–1.2)
BUN SERPL-MCNC: 25 MG/DL (ref 9.8–20.1)
CALCIUM SERPL-MCNC: 10.2 MG/DL (ref 7.8–10.44)
CHLORIDE SERPL-SCNC: 102 MMOL/L (ref 98–107)
CK MB SERPL-MCNC: 9.3 NG/ML (ref 0–6.6)
CK SERPL-CCNC: 101 U/L (ref 29–168)
CO2 SERPL-SCNC: 14 MMOL/L (ref 23–31)
CREAT CL PREDICTED SERPL C-G-VRATE: 0 ML/MIN (ref 70–130)
EOSINOPHIL # BLD AUTO: 0 10X3/UL (ref 0–0.5)
EOSINOPHIL NFR BLD AUTO: 0 % (ref 0–6)
GLOBULIN SER CALC-MCNC: 3.3 G/DL (ref 2.4–3.5)
GLUCOSE SERPL-MCNC: 129 MG/DL (ref 80–115)
HGB BLD-MCNC: 17 G/DL (ref 12–15.5)
INR PPP: 1.3
LIPASE SERPL-CCNC: 25 U/L (ref 8–78)
LYMPHOCYTES NFR BLD AUTO: 8.9 % (ref 18–47)
MAGNESIUM SERPL-MCNC: 2.3 MG/DL (ref 1.6–2.6)
MCH RBC QN AUTO: 27.6 PG (ref 27–33)
MCV RBC AUTO: 84.4 FL (ref 81.6–98.3)
MONOCYTES # BLD AUTO: 0.7 10X3/UL (ref 0–1.1)
MONOCYTES NFR BLD AUTO: 4.9 % (ref 0–10)
NEUTROPHILS # BLD AUTO: 11.6 10X3/UL (ref 1.5–8.4)
NEUTROPHILS NFR BLD AUTO: 85.5 % (ref 40–75)
PLATELET # BLD AUTO: 392 10X3/UL (ref 150–450)
POTASSIUM SERPL-SCNC: 4 MMOL/L (ref 3.5–5.1)
PROTHROMBIN TIME: 13.7 SEC (ref 9.5–12.1)
RBC # BLD AUTO: 6.16 10X6/UL (ref 3.9–5.03)
SODIUM SERPL-SCNC: 139 MMOL/L (ref 136–145)
TROPONIN I SERPL DL<=0.01 NG/ML-MCNC: 0.69 NG/ML (ref ?–0.03)
WBC # BLD AUTO: 13.6 10X3/UL (ref 3.5–10.5)

## 2021-12-21 PROCEDURE — 80061 LIPID PANEL: CPT

## 2021-12-21 PROCEDURE — 83690 ASSAY OF LIPASE: CPT

## 2021-12-21 PROCEDURE — 83036 HEMOGLOBIN GLYCOSYLATED A1C: CPT

## 2021-12-21 PROCEDURE — 71045 X-RAY EXAM CHEST 1 VIEW: CPT

## 2021-12-21 PROCEDURE — 84443 ASSAY THYROID STIM HORMONE: CPT

## 2021-12-21 PROCEDURE — 80048 BASIC METABOLIC PNL TOTAL CA: CPT

## 2021-12-21 PROCEDURE — U0003 INFECTIOUS AGENT DETECTION BY NUCLEIC ACID (DNA OR RNA); SEVERE ACUTE RESPIRATORY SYNDROME CORONAVIRUS 2 (SARS-COV-2) (CORONAVIRUS DISEASE [COVID-19]), AMPLIFIED PROBE TECHNIQUE, MAKING USE OF HIGH THROUGHPUT TECHNOLOGIES AS DESCRIBED BY CMS-2020-01-R: HCPCS

## 2021-12-21 PROCEDURE — 82550 ASSAY OF CK (CPK): CPT

## 2021-12-21 PROCEDURE — 93005 ELECTROCARDIOGRAM TRACING: CPT

## 2021-12-21 PROCEDURE — 74176 CT ABD & PELVIS W/O CONTRAST: CPT

## 2021-12-21 PROCEDURE — 36415 COLL VENOUS BLD VENIPUNCTURE: CPT

## 2021-12-21 PROCEDURE — 84484 ASSAY OF TROPONIN QUANT: CPT

## 2021-12-21 PROCEDURE — 94760 N-INVAS EAR/PLS OXIMETRY 1: CPT

## 2021-12-21 PROCEDURE — 93010 ELECTROCARDIOGRAM REPORT: CPT

## 2021-12-21 PROCEDURE — 85610 PROTHROMBIN TIME: CPT

## 2021-12-21 PROCEDURE — S0028 INJECTION, FAMOTIDINE, 20 MG: HCPCS

## 2021-12-21 PROCEDURE — 83735 ASSAY OF MAGNESIUM: CPT

## 2021-12-21 PROCEDURE — 85730 THROMBOPLASTIN TIME PARTIAL: CPT

## 2021-12-21 PROCEDURE — 85025 COMPLETE CBC W/AUTO DIFF WBC: CPT

## 2021-12-21 PROCEDURE — 83605 ASSAY OF LACTIC ACID: CPT

## 2021-12-21 PROCEDURE — 93306 TTE W/DOPPLER COMPLETE: CPT

## 2021-12-21 PROCEDURE — 96375 TX/PRO/DX INJ NEW DRUG ADDON: CPT

## 2021-12-21 PROCEDURE — C9113 INJ PANTOPRAZOLE SODIUM, VIA: HCPCS

## 2021-12-21 PROCEDURE — 80053 COMPREHEN METABOLIC PANEL: CPT

## 2021-12-21 PROCEDURE — U0005 INFEC AGEN DETEC AMPLI PROBE: HCPCS

## 2021-12-21 PROCEDURE — 96372 THER/PROPH/DIAG INJ SC/IM: CPT

## 2021-12-21 PROCEDURE — 96374 THER/PROPH/DIAG INJ IV PUSH: CPT

## 2021-12-21 PROCEDURE — 82553 CREATINE MB FRACTION: CPT

## 2021-12-21 RX ADMIN — ONDANSETRON PRN MG: 2 INJECTION INTRAMUSCULAR; INTRAVENOUS at 21:58

## 2021-12-22 LAB
ANION GAP SERPL CALC-SCNC: 18 MMOL/L (ref 10–20)
BASOPHILS # BLD AUTO: 0 10X3/UL (ref 0–0.2)
BASOPHILS NFR BLD AUTO: 0.3 % (ref 0–2)
BUN SERPL-MCNC: 30 MG/DL (ref 9.8–20.1)
CALCIUM SERPL-MCNC: 9.6 MG/DL (ref 7.8–10.44)
CHD RISK SERPL-RTO: 5.2 (ref ?–4.5)
CHLORIDE SERPL-SCNC: 102 MMOL/L (ref 98–107)
CHOLEST SERPL-MCNC: 303 MG/DL
CO2 SERPL-SCNC: 23 MMOL/L (ref 23–31)
CREAT CL PREDICTED SERPL C-G-VRATE: 56 ML/MIN (ref 70–130)
EOSINOPHIL # BLD AUTO: 0 10X3/UL (ref 0–0.5)
EOSINOPHIL NFR BLD AUTO: 0 % (ref 0–6)
GLUCOSE SERPL-MCNC: 98 MG/DL (ref 80–115)
HDLC SERPL-MCNC: 58 MG/DL
HGB BLD-MCNC: 15.2 G/DL (ref 12–15.5)
LDLC SERPL CALC-MCNC: 218 MG/DL
LYMPHOCYTES NFR BLD AUTO: 19.8 % (ref 18–47)
MCH RBC QN AUTO: 28.2 PG (ref 27–33)
MCV RBC AUTO: 83.3 FL (ref 81.6–98.3)
MONOCYTES # BLD AUTO: 0.9 10X3/UL (ref 0–1.1)
MONOCYTES NFR BLD AUTO: 9.4 % (ref 0–10)
NEUTROPHILS # BLD AUTO: 6.6 10X3/UL (ref 1.5–8.4)
NEUTROPHILS NFR BLD AUTO: 70.1 % (ref 40–75)
PLATELET # BLD AUTO: 350 10X3/UL (ref 150–450)
POTASSIUM SERPL-SCNC: 3.1 MMOL/L (ref 3.5–5.1)
RBC # BLD AUTO: 5.39 10X6/UL (ref 3.9–5.03)
SODIUM SERPL-SCNC: 140 MMOL/L (ref 136–145)
TRIGL SERPL-MCNC: 136 MG/DL (ref ?–150)
TROPONIN I SERPL DL<=0.01 NG/ML-MCNC: 0.63 NG/ML (ref ?–0.03)
WBC # BLD AUTO: 9.4 10X3/UL (ref 3.5–10.5)

## 2021-12-22 RX ADMIN — Medication SCH ML: at 21:33

## 2021-12-22 RX ADMIN — Medication SCH ML: at 10:00

## 2021-12-22 RX ADMIN — ONDANSETRON PRN MG: 2 INJECTION INTRAMUSCULAR; INTRAVENOUS at 06:29

## 2021-12-22 RX ADMIN — Medication SCH ML: at 04:05

## 2021-12-22 RX ADMIN — PANTOPRAZOLE SODIUM SCH MG: 40 GRANULE, DELAYED RELEASE ORAL at 08:44

## 2021-12-23 VITALS — DIASTOLIC BLOOD PRESSURE: 76 MMHG | TEMPERATURE: 97.9 F | SYSTOLIC BLOOD PRESSURE: 138 MMHG

## 2021-12-23 RX ADMIN — ONDANSETRON PRN MG: 2 INJECTION INTRAMUSCULAR; INTRAVENOUS at 04:53

## 2021-12-23 RX ADMIN — Medication SCH ML: at 07:09

## 2021-12-23 RX ADMIN — PANTOPRAZOLE SODIUM SCH MG: 40 GRANULE, DELAYED RELEASE ORAL at 07:06

## 2022-09-09 ENCOUNTER — HOSPITAL ENCOUNTER (INPATIENT)
Dept: HOSPITAL 92 - CSHERS | Age: 72
LOS: 7 days | Discharge: HOME | DRG: 871 | End: 2022-09-16
Attending: INTERNAL MEDICINE | Admitting: FAMILY MEDICINE
Payer: MEDICARE

## 2022-09-09 VITALS — BODY MASS INDEX: 27.1 KG/M2

## 2022-09-09 DIAGNOSIS — G89.4: ICD-10-CM

## 2022-09-09 DIAGNOSIS — K65.1: ICD-10-CM

## 2022-09-09 DIAGNOSIS — K21.9: ICD-10-CM

## 2022-09-09 DIAGNOSIS — Z90.89: ICD-10-CM

## 2022-09-09 DIAGNOSIS — Z80.3: ICD-10-CM

## 2022-09-09 DIAGNOSIS — M79.7: ICD-10-CM

## 2022-09-09 DIAGNOSIS — I11.0: ICD-10-CM

## 2022-09-09 DIAGNOSIS — E83.39: ICD-10-CM

## 2022-09-09 DIAGNOSIS — F32.A: ICD-10-CM

## 2022-09-09 DIAGNOSIS — Z88.0: ICD-10-CM

## 2022-09-09 DIAGNOSIS — I48.0: ICD-10-CM

## 2022-09-09 DIAGNOSIS — Z88.8: ICD-10-CM

## 2022-09-09 DIAGNOSIS — K57.20: ICD-10-CM

## 2022-09-09 DIAGNOSIS — E83.42: ICD-10-CM

## 2022-09-09 DIAGNOSIS — I50.42: ICD-10-CM

## 2022-09-09 DIAGNOSIS — Z79.899: ICD-10-CM

## 2022-09-09 DIAGNOSIS — Z87.891: ICD-10-CM

## 2022-09-09 DIAGNOSIS — Z87.11: ICD-10-CM

## 2022-09-09 DIAGNOSIS — I47.1: ICD-10-CM

## 2022-09-09 DIAGNOSIS — E87.1: ICD-10-CM

## 2022-09-09 DIAGNOSIS — Z79.890: ICD-10-CM

## 2022-09-09 DIAGNOSIS — E86.0: ICD-10-CM

## 2022-09-09 DIAGNOSIS — Z90.49: ICD-10-CM

## 2022-09-09 DIAGNOSIS — E87.6: ICD-10-CM

## 2022-09-09 DIAGNOSIS — E03.9: ICD-10-CM

## 2022-09-09 DIAGNOSIS — Z88.6: ICD-10-CM

## 2022-09-09 DIAGNOSIS — G47.00: ICD-10-CM

## 2022-09-09 DIAGNOSIS — K56.7: ICD-10-CM

## 2022-09-09 DIAGNOSIS — A41.9: Primary | ICD-10-CM

## 2022-09-09 DIAGNOSIS — Z20.822: ICD-10-CM

## 2022-09-09 DIAGNOSIS — R79.89: ICD-10-CM

## 2022-09-09 LAB
ALBUMIN SERPL BCG-MCNC: 4.3 G/DL (ref 3.4–4.8)
ALP SERPL-CCNC: 81 U/L (ref 40–110)
ALT SERPL W P-5'-P-CCNC: 11 U/L (ref 8–55)
ANION GAP SERPL CALC-SCNC: 22 MMOL/L (ref 10–20)
AST SERPL-CCNC: 24 U/L (ref 5–34)
BASOPHILS # BLD AUTO: 0 10X3/UL (ref 0–0.2)
BASOPHILS NFR BLD AUTO: 0.2 % (ref 0–2)
BILIRUB SERPL-MCNC: 1.2 MG/DL (ref 0.2–1.2)
BUN SERPL-MCNC: 47 MG/DL (ref 9.8–20.1)
CALCIUM SERPL-MCNC: 9.8 MG/DL (ref 7.8–10.44)
CHLORIDE SERPL-SCNC: 102 MMOL/L (ref 98–107)
CK MB SERPL-MCNC: 3.5 NG/ML (ref 0–6.6)
CK SERPL-CCNC: 45 U/L (ref 29–168)
CO2 SERPL-SCNC: 21 MMOL/L (ref 23–31)
CREAT CL PREDICTED SERPL C-G-VRATE: 0 ML/MIN (ref 70–130)
EOSINOPHIL # BLD AUTO: 0 10X3/UL (ref 0–0.5)
EOSINOPHIL NFR BLD AUTO: 0.2 % (ref 0–6)
GLOBULIN SER CALC-MCNC: 2.9 G/DL (ref 2.4–3.5)
GLUCOSE SERPL-MCNC: 104 MG/DL (ref 80–115)
HGB BLD-MCNC: 16.3 G/DL (ref 12–15.5)
LEUKOCYTE ESTERASE UR QL STRIP.AUTO: 25
LIPASE SERPL-CCNC: 54 U/L (ref 8–78)
LYMPHOCYTES NFR BLD AUTO: 21.5 % (ref 18–47)
MCH RBC QN AUTO: 29 PG (ref 27–33)
MCV RBC AUTO: 84 FL (ref 81.6–98.3)
MONOCYTES # BLD AUTO: 0.9 10X3/UL (ref 0–1.1)
MONOCYTES NFR BLD AUTO: 9.8 % (ref 0–10)
NEUTROPHILS # BLD AUTO: 6.3 10X3/UL (ref 1.5–8.4)
NEUTROPHILS NFR BLD AUTO: 67.7 % (ref 40–75)
PLATELET # BLD AUTO: 447 10X3/UL (ref 150–450)
POTASSIUM SERPL-SCNC: 4.7 MMOL/L (ref 3.5–5.1)
PROT UR STRIP.AUTO-MCNC: 30 MG/DL
RBC # BLD AUTO: 5.63 10X6/UL (ref 3.9–5.03)
SODIUM SERPL-SCNC: 140 MMOL/L (ref 136–145)
SP GR UR STRIP: 1.01 (ref 1–1.04)
TROPONIN I SERPL DL<=0.01 NG/ML-MCNC: 0.05 NG/ML (ref ?–0.03)
TROPONIN I SERPL DL<=0.01 NG/ML-MCNC: 0.06 NG/ML (ref ?–0.03)
WBC # BLD AUTO: 9.3 10X3/UL (ref 3.5–10.5)
WBC UR QL AUTO: (no result) HPF (ref 0–3)

## 2022-09-09 PROCEDURE — 96372 THER/PROPH/DIAG INJ SC/IM: CPT

## 2022-09-09 PROCEDURE — 82553 CREATINE MB FRACTION: CPT

## 2022-09-09 PROCEDURE — 84100 ASSAY OF PHOSPHORUS: CPT

## 2022-09-09 PROCEDURE — 83605 ASSAY OF LACTIC ACID: CPT

## 2022-09-09 PROCEDURE — 86140 C-REACTIVE PROTEIN: CPT

## 2022-09-09 PROCEDURE — 83690 ASSAY OF LIPASE: CPT

## 2022-09-09 PROCEDURE — 96365 THER/PROPH/DIAG IV INF INIT: CPT

## 2022-09-09 PROCEDURE — 96376 TX/PRO/DX INJ SAME DRUG ADON: CPT

## 2022-09-09 PROCEDURE — 85610 PROTHROMBIN TIME: CPT

## 2022-09-09 PROCEDURE — 84484 ASSAY OF TROPONIN QUANT: CPT

## 2022-09-09 PROCEDURE — 85025 COMPLETE CBC W/AUTO DIFF WBC: CPT

## 2022-09-09 PROCEDURE — 87811 SARS-COV-2 COVID19 W/OPTIC: CPT

## 2022-09-09 PROCEDURE — 80053 COMPREHEN METABOLIC PANEL: CPT

## 2022-09-09 PROCEDURE — 96368 THER/DIAG CONCURRENT INF: CPT

## 2022-09-09 PROCEDURE — 81003 URINALYSIS AUTO W/O SCOPE: CPT

## 2022-09-09 PROCEDURE — 85730 THROMBOPLASTIN TIME PARTIAL: CPT

## 2022-09-09 PROCEDURE — C9113 INJ PANTOPRAZOLE SODIUM, VIA: HCPCS

## 2022-09-09 PROCEDURE — 74177 CT ABD & PELVIS W/CONTRAST: CPT

## 2022-09-09 PROCEDURE — 82550 ASSAY OF CK (CPK): CPT

## 2022-09-09 PROCEDURE — 96375 TX/PRO/DX INJ NEW DRUG ADDON: CPT

## 2022-09-09 PROCEDURE — 93005 ELECTROCARDIOGRAM TRACING: CPT

## 2022-09-09 PROCEDURE — 83735 ASSAY OF MAGNESIUM: CPT

## 2022-09-09 PROCEDURE — 36415 COLL VENOUS BLD VENIPUNCTURE: CPT

## 2022-09-09 PROCEDURE — 81015 MICROSCOPIC EXAM OF URINE: CPT

## 2022-09-09 PROCEDURE — 96361 HYDRATE IV INFUSION ADD-ON: CPT

## 2022-09-09 PROCEDURE — 80048 BASIC METABOLIC PNL TOTAL CA: CPT

## 2022-09-10 LAB
ANION GAP SERPL CALC-SCNC: 13 MMOL/L (ref 10–20)
BASOPHILS # BLD AUTO: 0 10X3/UL (ref 0–0.2)
BASOPHILS NFR BLD AUTO: 0.4 % (ref 0–2)
BUN SERPL-MCNC: 32 MG/DL (ref 9.8–20.1)
CALCIUM SERPL-MCNC: 8.7 MG/DL (ref 7.8–10.44)
CHLORIDE SERPL-SCNC: 103 MMOL/L (ref 98–107)
CO2 SERPL-SCNC: 26 MMOL/L (ref 23–31)
CREAT CL PREDICTED SERPL C-G-VRATE: 74 ML/MIN (ref 70–130)
EOSINOPHIL # BLD AUTO: 0.1 10X3/UL (ref 0–0.5)
EOSINOPHIL NFR BLD AUTO: 0.6 % (ref 0–6)
GLUCOSE SERPL-MCNC: 87 MG/DL (ref 80–115)
HGB BLD-MCNC: 13.4 G/DL (ref 12–15.5)
LYMPHOCYTES NFR BLD AUTO: 24.9 % (ref 18–47)
MCH RBC QN AUTO: 29.5 PG (ref 27–33)
MCV RBC AUTO: 86.3 FL (ref 81.6–98.3)
MONOCYTES # BLD AUTO: 0.8 10X3/UL (ref 0–1.1)
MONOCYTES NFR BLD AUTO: 8.9 % (ref 0–10)
NEUTROPHILS # BLD AUTO: 5.7 10X3/UL (ref 1.5–8.4)
NEUTROPHILS NFR BLD AUTO: 63.9 % (ref 40–75)
PLATELET # BLD AUTO: 386 10X3/UL (ref 150–450)
POTASSIUM SERPL-SCNC: 2.8 MMOL/L (ref 3.5–5.1)
RBC # BLD AUTO: 4.54 10X6/UL (ref 3.9–5.03)
SODIUM SERPL-SCNC: 139 MMOL/L (ref 136–145)
WBC # BLD AUTO: 8.9 10X3/UL (ref 3.5–10.5)

## 2022-09-10 RX ADMIN — METRONIDAZOLE SCH MLS: 500 INJECTION, SOLUTION INTRAVENOUS at 01:32

## 2022-09-10 RX ADMIN — METRONIDAZOLE SCH MLS: 500 INJECTION, SOLUTION INTRAVENOUS at 10:00

## 2022-09-10 RX ADMIN — METRONIDAZOLE SCH MLS: 500 INJECTION, SOLUTION INTRAVENOUS at 17:10

## 2022-09-11 LAB
ANION GAP SERPL CALC-SCNC: 11 MMOL/L (ref 10–20)
BASOPHILS # BLD AUTO: 0.1 10X3/UL (ref 0–0.2)
BASOPHILS NFR BLD AUTO: 0.8 % (ref 0–2)
BUN SERPL-MCNC: 21 MG/DL (ref 9.8–20.1)
CALCIUM SERPL-MCNC: 8.4 MG/DL (ref 7.8–10.44)
CHLORIDE SERPL-SCNC: 106 MMOL/L (ref 98–107)
CO2 SERPL-SCNC: 23 MMOL/L (ref 23–31)
CREAT CL PREDICTED SERPL C-G-VRATE: 72 ML/MIN (ref 70–130)
EOSINOPHIL # BLD AUTO: 0.1 10X3/UL (ref 0–0.5)
EOSINOPHIL NFR BLD AUTO: 0.6 % (ref 0–6)
GLUCOSE SERPL-MCNC: 83 MG/DL (ref 80–115)
HGB BLD-MCNC: 11.7 G/DL (ref 12–15.5)
LYMPHOCYTES NFR BLD AUTO: 22.8 % (ref 18–47)
MAGNESIUM SERPL-MCNC: 1.8 MG/DL (ref 1.6–2.6)
MCH RBC QN AUTO: 28.8 PG (ref 27–33)
MCV RBC AUTO: 85.2 FL (ref 81.6–98.3)
MONOCYTES # BLD AUTO: 1.2 10X3/UL (ref 0–1.1)
MONOCYTES NFR BLD AUTO: 11 % (ref 0–10)
NEUTROPHILS # BLD AUTO: 6.7 10X3/UL (ref 1.5–8.4)
NEUTROPHILS NFR BLD AUTO: 62.4 % (ref 40–75)
PLATELET # BLD AUTO: 398 10X3/UL (ref 150–450)
POTASSIUM SERPL-SCNC: 3.1 MMOL/L (ref 3.5–5.1)
POTASSIUM SERPL-SCNC: 3.4 MMOL/L (ref 3.5–5.1)
RBC # BLD AUTO: 4.06 10X6/UL (ref 3.9–5.03)
SODIUM SERPL-SCNC: 137 MMOL/L (ref 136–145)
WBC # BLD AUTO: 10.7 10X3/UL (ref 3.5–10.5)

## 2022-09-11 RX ADMIN — METRONIDAZOLE SCH MLS: 500 INJECTION, SOLUTION INTRAVENOUS at 16:16

## 2022-09-11 RX ADMIN — POTASSIUM & SODIUM PHOSPHATES POWDER PACK 280-160-250 MG SCH PKT: 280-160-250 PACK at 20:33

## 2022-09-11 RX ADMIN — POTASSIUM & SODIUM PHOSPHATES POWDER PACK 280-160-250 MG SCH PKT: 280-160-250 PACK at 16:15

## 2022-09-11 RX ADMIN — METRONIDAZOLE SCH MLS: 500 INJECTION, SOLUTION INTRAVENOUS at 09:32

## 2022-09-11 RX ADMIN — METRONIDAZOLE SCH MLS: 500 INJECTION, SOLUTION INTRAVENOUS at 01:27

## 2022-09-11 RX ADMIN — DIPHENOXYLATE HYDROCHLORIDE AND ATROPINE SULFATE PRN TAB: 2.5; .025 TABLET ORAL at 20:34

## 2022-09-11 RX ADMIN — ONDANSETRON PRN MG: 2 INJECTION INTRAMUSCULAR; INTRAVENOUS at 16:49

## 2022-09-12 LAB
ANION GAP SERPL CALC-SCNC: 13 MMOL/L (ref 10–20)
BUN SERPL-MCNC: 10 MG/DL (ref 9.8–20.1)
CALCIUM SERPL-MCNC: 8.3 MG/DL (ref 7.8–10.44)
CHLORIDE SERPL-SCNC: 104 MMOL/L (ref 98–107)
CO2 SERPL-SCNC: 24 MMOL/L (ref 23–31)
CREAT CL PREDICTED SERPL C-G-VRATE: 73 ML/MIN (ref 70–130)
GLUCOSE SERPL-MCNC: 106 MG/DL (ref 80–115)
HGB BLD-MCNC: 13 G/DL (ref 12–15.5)
MCH RBC QN AUTO: 28.9 PG (ref 27–33)
MCV RBC AUTO: 87.1 FL (ref 81.6–98.3)
MDIFF COMPLETE?: YES
PLATELET # BLD AUTO: 413 10X3/UL (ref 150–450)
POTASSIUM SERPL-SCNC: 3.5 MMOL/L (ref 3.5–5.1)
RBC # BLD AUTO: 4.5 10X6/UL (ref 3.9–5.03)
SODIUM SERPL-SCNC: 137 MMOL/L (ref 136–145)
WBC # BLD AUTO: 11 10X3/UL (ref 3.5–10.5)

## 2022-09-12 RX ADMIN — DIPHENOXYLATE HYDROCHLORIDE AND ATROPINE SULFATE PRN TAB: 2.5; .025 TABLET ORAL at 11:51

## 2022-09-12 RX ADMIN — LIDOCAINE HYDROCHLORIDE PRN DOSE: 20 SOLUTION ORAL at 11:00

## 2022-09-12 RX ADMIN — METRONIDAZOLE SCH MLS: 500 INJECTION, SOLUTION INTRAVENOUS at 02:15

## 2022-09-12 RX ADMIN — METRONIDAZOLE SCH MLS: 500 INJECTION, SOLUTION INTRAVENOUS at 16:28

## 2022-09-12 RX ADMIN — ONDANSETRON PRN MG: 2 INJECTION INTRAMUSCULAR; INTRAVENOUS at 02:30

## 2022-09-12 RX ADMIN — LIDOCAINE HYDROCHLORIDE PRN DOSE: 20 SOLUTION ORAL at 11:40

## 2022-09-12 RX ADMIN — METRONIDAZOLE SCH MLS: 500 INJECTION, SOLUTION INTRAVENOUS at 08:42

## 2022-09-13 LAB
ANION GAP SERPL CALC-SCNC: 14 MMOL/L (ref 10–20)
BUN SERPL-MCNC: 9 MG/DL (ref 9.8–20.1)
CALCIUM SERPL-MCNC: 8.5 MG/DL (ref 7.8–10.44)
CHLORIDE SERPL-SCNC: 101 MMOL/L (ref 98–107)
CO2 SERPL-SCNC: 23 MMOL/L (ref 23–31)
CREAT CL PREDICTED SERPL C-G-VRATE: 79 ML/MIN (ref 70–130)
GLUCOSE SERPL-MCNC: 90 MG/DL (ref 80–115)
HGB BLD-MCNC: 12.2 G/DL (ref 12–15.5)
MCH RBC QN AUTO: 28.6 PG (ref 27–33)
MCV RBC AUTO: 89 FL (ref 81.6–98.3)
MDIFF COMPLETE?: YES
PLATELET # BLD AUTO: 382 10X3/UL (ref 150–450)
POTASSIUM SERPL-SCNC: 3 MMOL/L (ref 3.5–5.1)
POTASSIUM SERPL-SCNC: 3.2 MMOL/L (ref 3.5–5.1)
RBC # BLD AUTO: 4.26 10X6/UL (ref 3.9–5.03)
SODIUM SERPL-SCNC: 135 MMOL/L (ref 136–145)
WBC # BLD AUTO: 8.8 10X3/UL (ref 3.5–10.5)

## 2022-09-13 RX ADMIN — METRONIDAZOLE SCH MLS: 500 INJECTION, SOLUTION INTRAVENOUS at 14:21

## 2022-09-13 RX ADMIN — METRONIDAZOLE SCH: 500 INJECTION, SOLUTION INTRAVENOUS at 01:00

## 2022-09-13 RX ADMIN — POTASSIUM & SODIUM PHOSPHATES POWDER PACK 280-160-250 MG SCH PKT: 280-160-250 PACK at 07:46

## 2022-09-13 RX ADMIN — METRONIDAZOLE SCH MLS: 500 INJECTION, SOLUTION INTRAVENOUS at 06:04

## 2022-09-13 RX ADMIN — POTASSIUM & SODIUM PHOSPHATES POWDER PACK 280-160-250 MG SCH PKT: 280-160-250 PACK at 11:30

## 2022-09-13 RX ADMIN — METRONIDAZOLE SCH MLS: 500 INJECTION, SOLUTION INTRAVENOUS at 21:52

## 2022-09-13 RX ADMIN — POTASSIUM CHLORIDE AND SODIUM CHLORIDE SCH: 900; 150 INJECTION, SOLUTION INTRAVENOUS at 18:18

## 2022-09-14 LAB
ALBUMIN SERPL BCG-MCNC: 2.8 G/DL (ref 3.4–4.8)
ALP SERPL-CCNC: 47 U/L (ref 40–110)
ALT SERPL W P-5'-P-CCNC: 7 U/L (ref 8–55)
ANION GAP SERPL CALC-SCNC: 11 MMOL/L (ref 10–20)
AST SERPL-CCNC: 12 U/L (ref 5–34)
BILIRUB SERPL-MCNC: 0.4 MG/DL (ref 0.2–1.2)
BUN SERPL-MCNC: 5 MG/DL (ref 9.8–20.1)
CALCIUM SERPL-MCNC: 7.9 MG/DL (ref 7.8–10.44)
CHLORIDE SERPL-SCNC: 107 MMOL/L (ref 98–107)
CO2 SERPL-SCNC: 25 MMOL/L (ref 23–31)
CREAT CL PREDICTED SERPL C-G-VRATE: 87 ML/MIN (ref 70–130)
GLOBULIN SER CALC-MCNC: 1.9 G/DL (ref 2.4–3.5)
GLUCOSE SERPL-MCNC: 87 MG/DL (ref 80–115)
HGB BLD-MCNC: 10.4 G/DL (ref 12–15.5)
MAGNESIUM SERPL-MCNC: 1.6 MG/DL (ref 1.6–2.6)
MCH RBC QN AUTO: 29.3 PG (ref 27–33)
MCV RBC AUTO: 87.9 FL (ref 81.6–98.3)
MDIFF COMPLETE?: YES
PLATELET # BLD AUTO: 297 10X3/UL (ref 150–450)
POTASSIUM SERPL-SCNC: 3.6 MMOL/L (ref 3.5–5.1)
RBC # BLD AUTO: 3.55 10X6/UL (ref 3.9–5.03)
SODIUM SERPL-SCNC: 139 MMOL/L (ref 136–145)
WBC # BLD AUTO: 8.4 10X3/UL (ref 3.5–10.5)

## 2022-09-14 RX ADMIN — POTASSIUM CHLORIDE AND SODIUM CHLORIDE SCH MLS: 900; 150 INJECTION, SOLUTION INTRAVENOUS at 11:00

## 2022-09-14 RX ADMIN — METRONIDAZOLE SCH MLS: 500 INJECTION, SOLUTION INTRAVENOUS at 20:56

## 2022-09-14 RX ADMIN — METRONIDAZOLE SCH MLS: 500 INJECTION, SOLUTION INTRAVENOUS at 14:04

## 2022-09-14 RX ADMIN — METRONIDAZOLE SCH MLS: 500 INJECTION, SOLUTION INTRAVENOUS at 05:31

## 2022-09-14 RX ADMIN — POTASSIUM CHLORIDE AND SODIUM CHLORIDE SCH: 900; 150 INJECTION, SOLUTION INTRAVENOUS at 19:37

## 2022-09-15 LAB
ALBUMIN SERPL BCG-MCNC: 3.1 G/DL (ref 3.4–4.8)
ALP SERPL-CCNC: 50 U/L (ref 40–110)
ALT SERPL W P-5'-P-CCNC: 8 U/L (ref 8–55)
ANION GAP SERPL CALC-SCNC: 15 MMOL/L (ref 10–20)
APTT PPP: 29.1 SEC (ref 22–33)
AST SERPL-CCNC: 13 U/L (ref 5–34)
BASOPHILS # BLD AUTO: 0.1 10X3/UL (ref 0–0.2)
BASOPHILS NFR BLD AUTO: 0.6 % (ref 0–2)
BILIRUB SERPL-MCNC: 0.3 MG/DL (ref 0.2–1.2)
BUN SERPL-MCNC: (no result) MG/DL (ref 9.8–20.1)
CALCIUM SERPL-MCNC: 8.1 MG/DL (ref 7.8–10.44)
CHLORIDE SERPL-SCNC: 106 MMOL/L (ref 98–107)
CO2 SERPL-SCNC: 24 MMOL/L (ref 23–31)
CREAT CL PREDICTED SERPL C-G-VRATE: 91 ML/MIN (ref 70–130)
EOSINOPHIL # BLD AUTO: 0.3 10X3/UL (ref 0–0.5)
EOSINOPHIL NFR BLD AUTO: 2.7 % (ref 0–6)
GLOBULIN SER CALC-MCNC: 2 G/DL (ref 2.4–3.5)
GLUCOSE SERPL-MCNC: 98 MG/DL (ref 80–115)
HGB BLD-MCNC: 11 G/DL (ref 12–15.5)
INR PPP: 1.1
LYMPHOCYTES NFR BLD AUTO: 23.3 % (ref 18–47)
MAGNESIUM SERPL-MCNC: 2.6 MG/DL (ref 1.6–2.6)
MCH RBC QN AUTO: 29.3 PG (ref 27–33)
MCV RBC AUTO: 86.7 FL (ref 81.6–98.3)
MONOCYTES # BLD AUTO: 1.3 10X3/UL (ref 0–1.1)
MONOCYTES NFR BLD AUTO: 14 % (ref 0–10)
NEUTROPHILS # BLD AUTO: 5.4 10X3/UL (ref 1.5–8.4)
NEUTROPHILS NFR BLD AUTO: 57.4 % (ref 40–75)
PLATELET # BLD AUTO: 323 10X3/UL (ref 150–450)
POTASSIUM SERPL-SCNC: 3.4 MMOL/L (ref 3.5–5.1)
PROTHROMBIN TIME: 11.4 SEC (ref 9.5–12.1)
RBC # BLD AUTO: 3.75 10X6/UL (ref 3.9–5.03)
SODIUM SERPL-SCNC: 142 MMOL/L (ref 136–145)
WBC # BLD AUTO: 9.4 10X3/UL (ref 3.5–10.5)

## 2022-09-15 RX ADMIN — METRONIDAZOLE SCH MLS: 500 INJECTION, SOLUTION INTRAVENOUS at 05:30

## 2022-09-15 RX ADMIN — POTASSIUM CHLORIDE AND SODIUM CHLORIDE SCH: 900; 150 INJECTION, SOLUTION INTRAVENOUS at 12:12

## 2022-09-15 RX ADMIN — METRONIDAZOLE SCH MLS: 500 INJECTION, SOLUTION INTRAVENOUS at 20:30

## 2022-09-15 RX ADMIN — POTASSIUM CHLORIDE AND SODIUM CHLORIDE SCH MLS: 900; 150 INJECTION, SOLUTION INTRAVENOUS at 15:42

## 2022-09-15 RX ADMIN — METRONIDAZOLE SCH MLS: 500 INJECTION, SOLUTION INTRAVENOUS at 12:19

## 2022-09-15 RX ADMIN — DIPHENOXYLATE HYDROCHLORIDE AND ATROPINE SULFATE PRN TAB: 2.5; .025 TABLET ORAL at 00:41

## 2022-09-16 VITALS — TEMPERATURE: 98.4 F | DIASTOLIC BLOOD PRESSURE: 68 MMHG | SYSTOLIC BLOOD PRESSURE: 114 MMHG

## 2022-09-16 LAB
ANION GAP SERPL CALC-SCNC: 11 MMOL/L (ref 10–20)
BASOPHILS # BLD AUTO: 0.1 10X3/UL (ref 0–0.2)
BASOPHILS NFR BLD AUTO: 0.6 % (ref 0–2)
BUN SERPL-MCNC: 4 MG/DL (ref 9.8–20.1)
CALCIUM SERPL-MCNC: 8 MG/DL (ref 7.8–10.44)
CHLORIDE SERPL-SCNC: 105 MMOL/L (ref 98–107)
CO2 SERPL-SCNC: 28 MMOL/L (ref 23–31)
CREAT CL PREDICTED SERPL C-G-VRATE: 91 ML/MIN (ref 70–130)
CRP SERPL-MCNC: 3.05 MG/DL
EOSINOPHIL # BLD AUTO: 0.2 10X3/UL (ref 0–0.5)
EOSINOPHIL NFR BLD AUTO: 2.6 % (ref 0–6)
GLUCOSE SERPL-MCNC: 93 MG/DL (ref 80–115)
HGB BLD-MCNC: 10.2 G/DL (ref 12–15.5)
LYMPHOCYTES NFR BLD AUTO: 24 % (ref 18–47)
MCH RBC QN AUTO: 29.1 PG (ref 27–33)
MCV RBC AUTO: 88 FL (ref 81.6–98.3)
MONOCYTES # BLD AUTO: 1.1 10X3/UL (ref 0–1.1)
MONOCYTES NFR BLD AUTO: 14.3 % (ref 0–10)
NEUTROPHILS # BLD AUTO: 4.4 10X3/UL (ref 1.5–8.4)
NEUTROPHILS NFR BLD AUTO: 57.3 % (ref 40–75)
PLATELET # BLD AUTO: 283 10X3/UL (ref 150–450)
POTASSIUM SERPL-SCNC: 3.6 MMOL/L (ref 3.5–5.1)
RBC # BLD AUTO: 3.5 10X6/UL (ref 3.9–5.03)
SODIUM SERPL-SCNC: 140 MMOL/L (ref 136–145)
WBC # BLD AUTO: 7.7 10X3/UL (ref 3.5–10.5)

## 2022-09-16 RX ADMIN — POTASSIUM CHLORIDE AND SODIUM CHLORIDE SCH: 900; 150 INJECTION, SOLUTION INTRAVENOUS at 01:24

## 2022-09-16 RX ADMIN — POTASSIUM CHLORIDE AND SODIUM CHLORIDE SCH: 900; 150 INJECTION, SOLUTION INTRAVENOUS at 12:28

## 2022-09-16 RX ADMIN — METRONIDAZOLE SCH MLS: 500 INJECTION, SOLUTION INTRAVENOUS at 05:13

## 2022-09-16 RX ADMIN — METRONIDAZOLE SCH: 500 INJECTION, SOLUTION INTRAVENOUS at 12:25

## 2022-10-16 ENCOUNTER — HOSPITAL ENCOUNTER (EMERGENCY)
Dept: HOSPITAL 92 - CSHERS | Age: 72
Discharge: HOME | End: 2022-10-16
Payer: MEDICARE

## 2022-10-16 DIAGNOSIS — M79.7: ICD-10-CM

## 2022-10-16 DIAGNOSIS — G89.29: Primary | ICD-10-CM

## 2022-10-16 DIAGNOSIS — Z87.891: ICD-10-CM

## 2022-10-16 DIAGNOSIS — Z79.899: ICD-10-CM

## 2022-10-16 DIAGNOSIS — E03.9: ICD-10-CM

## 2022-10-16 DIAGNOSIS — G43.909: ICD-10-CM

## 2022-10-16 LAB
ALBUMIN SERPL BCG-MCNC: 4.2 G/DL (ref 3.4–4.8)
ALP SERPL-CCNC: 67 U/L (ref 40–110)
ALT SERPL W P-5'-P-CCNC: 11 U/L (ref 8–55)
ANION GAP SERPL CALC-SCNC: 14 MMOL/L (ref 10–20)
AST SERPL-CCNC: 19 U/L (ref 5–34)
BASOPHILS # BLD AUTO: 0.1 10X3/UL (ref 0–0.2)
BASOPHILS NFR BLD AUTO: 0.7 % (ref 0–2)
BILIRUB SERPL-MCNC: 0.6 MG/DL (ref 0.2–1.2)
BUN SERPL-MCNC: 11 MG/DL (ref 9.8–20.1)
CALCIUM SERPL-MCNC: 9.4 MG/DL (ref 7.8–10.44)
CHLORIDE SERPL-SCNC: 101 MMOL/L (ref 98–107)
CK SERPL-CCNC: 19 U/L (ref 29–168)
CO2 SERPL-SCNC: 26 MMOL/L (ref 23–31)
CREAT CL PREDICTED SERPL C-G-VRATE: 0 ML/MIN (ref 70–130)
EOSINOPHIL # BLD AUTO: 0 10X3/UL (ref 0–0.5)
EOSINOPHIL NFR BLD AUTO: 0.1 % (ref 0–6)
GLOBULIN SER CALC-MCNC: 2.3 G/DL (ref 2.4–3.5)
GLUCOSE SERPL-MCNC: 96 MG/DL (ref 80–115)
HGB BLD-MCNC: 13.4 G/DL (ref 12–15.5)
LYMPHOCYTES NFR BLD AUTO: 18.7 % (ref 18–47)
MCH RBC QN AUTO: 29.2 PG (ref 27–33)
MCV RBC AUTO: 86.3 FL (ref 81.6–98.3)
MONOCYTES # BLD AUTO: 0.7 10X3/UL (ref 0–1.1)
MONOCYTES NFR BLD AUTO: 10.6 % (ref 0–10)
NEUTROPHILS # BLD AUTO: 4.7 10X3/UL (ref 1.5–8.4)
NEUTROPHILS NFR BLD AUTO: 69.6 % (ref 40–75)
PLATELET # BLD AUTO: 278 10X3/UL (ref 150–450)
POTASSIUM SERPL-SCNC: 3.7 MMOL/L (ref 3.5–5.1)
RBC # BLD AUTO: 4.59 10X6/UL (ref 3.9–5.03)
SODIUM SERPL-SCNC: 137 MMOL/L (ref 136–145)
WBC # BLD AUTO: 6.7 10X3/UL (ref 3.5–10.5)

## 2022-10-16 PROCEDURE — 99283 EMERGENCY DEPT VISIT LOW MDM: CPT

## 2022-10-16 PROCEDURE — 84484 ASSAY OF TROPONIN QUANT: CPT

## 2022-10-16 PROCEDURE — 80053 COMPREHEN METABOLIC PANEL: CPT

## 2022-10-16 PROCEDURE — 85025 COMPLETE CBC W/AUTO DIFF WBC: CPT

## 2022-10-16 PROCEDURE — 82550 ASSAY OF CK (CPK): CPT

## 2022-12-12 ENCOUNTER — HOSPITAL ENCOUNTER (EMERGENCY)
Dept: HOSPITAL 92 - CSHERS | Age: 72
Discharge: HOME | End: 2022-12-12
Payer: MEDICARE

## 2022-12-12 DIAGNOSIS — R10.84: Primary | ICD-10-CM

## 2022-12-12 DIAGNOSIS — E03.9: ICD-10-CM

## 2022-12-12 DIAGNOSIS — R11.2: ICD-10-CM

## 2022-12-12 DIAGNOSIS — Z79.899: ICD-10-CM

## 2022-12-12 DIAGNOSIS — I10: ICD-10-CM

## 2022-12-12 LAB
ALBUMIN SERPL BCG-MCNC: 5 G/DL (ref 3.4–4.8)
ALP SERPL-CCNC: 101 U/L (ref 40–110)
ALT SERPL W P-5'-P-CCNC: 14 U/L (ref 8–55)
ANION GAP SERPL CALC-SCNC: 21 MMOL/L (ref 10–20)
AST SERPL-CCNC: 19 U/L (ref 5–34)
BASOPHILS # BLD AUTO: 0 10X3/UL (ref 0–0.2)
BASOPHILS NFR BLD AUTO: 0.4 % (ref 0–2)
BILIRUB SERPL-MCNC: 0.8 MG/DL (ref 0.2–1.2)
BUN SERPL-MCNC: 13 MG/DL (ref 9.8–20.1)
CALCIUM SERPL-MCNC: 10.5 MG/DL (ref 7.8–10.44)
CHLORIDE SERPL-SCNC: 103 MMOL/L (ref 98–107)
CO2 SERPL-SCNC: 20 MMOL/L (ref 23–31)
CREAT CL PREDICTED SERPL C-G-VRATE: 0 ML/MIN (ref 70–130)
EOSINOPHIL # BLD AUTO: 0 10X3/UL (ref 0–0.5)
EOSINOPHIL NFR BLD AUTO: 0.1 % (ref 0–6)
GLOBULIN SER CALC-MCNC: 3.5 G/DL (ref 2.4–3.5)
GLUCOSE SERPL-MCNC: 126 MG/DL (ref 80–115)
HGB BLD-MCNC: 16.3 G/DL (ref 12–15.5)
LIPASE SERPL-CCNC: 13 U/L (ref 8–78)
LYMPHOCYTES NFR BLD AUTO: 12.1 % (ref 18–47)
MAGNESIUM SERPL-MCNC: 2.1 MG/DL (ref 1.6–2.6)
MCH RBC QN AUTO: 29.1 PG (ref 27–33)
MCV RBC AUTO: 84.6 FL (ref 81.6–98.3)
MONOCYTES # BLD AUTO: 0.4 10X3/UL (ref 0–1.1)
MONOCYTES NFR BLD AUTO: 3.9 % (ref 0–10)
NEUTROPHILS # BLD AUTO: 9.2 10X3/UL (ref 1.5–8.4)
NEUTROPHILS NFR BLD AUTO: 83.1 % (ref 40–75)
PLATELET # BLD AUTO: 316 10X3/UL (ref 150–450)
POTASSIUM SERPL-SCNC: 3.9 MMOL/L (ref 3.5–5.1)
RBC # BLD AUTO: 5.6 10X6/UL (ref 3.9–5.03)
SODIUM SERPL-SCNC: 140 MMOL/L (ref 136–145)
WBC # BLD AUTO: 11.1 10X3/UL (ref 3.5–10.5)

## 2022-12-12 PROCEDURE — 96375 TX/PRO/DX INJ NEW DRUG ADDON: CPT

## 2022-12-12 PROCEDURE — 96376 TX/PRO/DX INJ SAME DRUG ADON: CPT

## 2022-12-12 PROCEDURE — 80053 COMPREHEN METABOLIC PANEL: CPT

## 2022-12-12 PROCEDURE — 74177 CT ABD & PELVIS W/CONTRAST: CPT

## 2022-12-12 PROCEDURE — 96374 THER/PROPH/DIAG INJ IV PUSH: CPT

## 2022-12-12 PROCEDURE — 83690 ASSAY OF LIPASE: CPT

## 2022-12-12 PROCEDURE — 83735 ASSAY OF MAGNESIUM: CPT

## 2022-12-12 PROCEDURE — 96361 HYDRATE IV INFUSION ADD-ON: CPT

## 2022-12-12 PROCEDURE — 85025 COMPLETE CBC W/AUTO DIFF WBC: CPT

## 2022-12-12 PROCEDURE — 36415 COLL VENOUS BLD VENIPUNCTURE: CPT

## 2022-12-13 ENCOUNTER — HOSPITAL ENCOUNTER (EMERGENCY)
Dept: HOSPITAL 92 - CSHERS | Age: 72
LOS: 1 days | Discharge: HOME | End: 2022-12-14
Payer: MEDICARE

## 2022-12-13 DIAGNOSIS — R10.13: Primary | ICD-10-CM

## 2022-12-13 DIAGNOSIS — I10: ICD-10-CM

## 2022-12-13 DIAGNOSIS — E03.9: ICD-10-CM

## 2022-12-13 DIAGNOSIS — E87.6: ICD-10-CM

## 2022-12-13 LAB
ALBUMIN SERPL BCG-MCNC: 4.8 G/DL (ref 3.4–4.8)
ALP SERPL-CCNC: 93 U/L (ref 40–110)
ALT SERPL W P-5'-P-CCNC: 16 U/L (ref 8–55)
ANION GAP SERPL CALC-SCNC: 19 MMOL/L (ref 10–20)
AST SERPL-CCNC: 27 U/L (ref 5–34)
BACTERIA UR QL AUTO: (no result) HPF
BASOPHILS # BLD AUTO: 0 10X3/UL (ref 0–0.2)
BASOPHILS NFR BLD AUTO: 0.1 % (ref 0–2)
BILIRUB SERPL-MCNC: 0.7 MG/DL (ref 0.2–1.2)
BUN SERPL-MCNC: 21 MG/DL (ref 9.8–20.1)
CALCIUM SERPL-MCNC: 10 MG/DL (ref 7.8–10.44)
CHLORIDE SERPL-SCNC: 105 MMOL/L (ref 98–107)
CO2 SERPL-SCNC: 22 MMOL/L (ref 23–31)
CREAT CL PREDICTED SERPL C-G-VRATE: 0 ML/MIN (ref 70–130)
EOSINOPHIL # BLD AUTO: 0 10X3/UL (ref 0–0.5)
EOSINOPHIL NFR BLD AUTO: 0 % (ref 0–6)
GLOBULIN SER CALC-MCNC: 3.4 G/DL (ref 2.4–3.5)
GLUCOSE SERPL-MCNC: 111 MG/DL (ref 80–115)
HGB BLD-MCNC: 15.7 G/DL (ref 12–15.5)
LEUKOCYTE ESTERASE UR QL STRIP.AUTO: 25
LIPASE SERPL-CCNC: 18 U/L (ref 8–78)
LYMPHOCYTES NFR BLD AUTO: 6.3 % (ref 18–47)
MCH RBC QN AUTO: 29.3 PG (ref 27–33)
MCV RBC AUTO: 84.3 FL (ref 81.6–98.3)
MONOCYTES # BLD AUTO: 0.6 10X3/UL (ref 0–1.1)
MONOCYTES NFR BLD AUTO: 5.7 % (ref 0–10)
NEUTROPHILS # BLD AUTO: 9.4 10X3/UL (ref 1.5–8.4)
NEUTROPHILS NFR BLD AUTO: 87.4 % (ref 40–75)
PLATELET # BLD AUTO: 316 10X3/UL (ref 150–450)
POTASSIUM SERPL-SCNC: 3 MMOL/L (ref 3.5–5.1)
PROT UR STRIP.AUTO-MCNC: 500 MG/DL
RBC # BLD AUTO: 5.35 10X6/UL (ref 3.9–5.03)
RBC UR QL AUTO: (no result) HPF (ref 0–3)
SODIUM SERPL-SCNC: 143 MMOL/L (ref 136–145)
SP GR UR STRIP: 1.02 (ref 1–1.03)
WBC # BLD AUTO: 10.8 10X3/UL (ref 3.5–10.5)
WBC UR QL AUTO: (no result) HPF (ref 0–3)

## 2022-12-13 PROCEDURE — 83605 ASSAY OF LACTIC ACID: CPT

## 2022-12-13 PROCEDURE — 83690 ASSAY OF LIPASE: CPT

## 2022-12-13 PROCEDURE — 81015 MICROSCOPIC EXAM OF URINE: CPT

## 2022-12-13 PROCEDURE — 93005 ELECTROCARDIOGRAM TRACING: CPT

## 2022-12-13 PROCEDURE — 96361 HYDRATE IV INFUSION ADD-ON: CPT

## 2022-12-13 PROCEDURE — 81003 URINALYSIS AUTO W/O SCOPE: CPT

## 2022-12-13 PROCEDURE — 85025 COMPLETE CBC W/AUTO DIFF WBC: CPT

## 2022-12-13 PROCEDURE — 80053 COMPREHEN METABOLIC PANEL: CPT

## 2022-12-13 PROCEDURE — 36415 COLL VENOUS BLD VENIPUNCTURE: CPT

## 2022-12-13 PROCEDURE — 96374 THER/PROPH/DIAG INJ IV PUSH: CPT

## 2022-12-13 PROCEDURE — 96375 TX/PRO/DX INJ NEW DRUG ADDON: CPT
